# Patient Record
Sex: MALE | Race: BLACK OR AFRICAN AMERICAN | Employment: FULL TIME | URBAN - METROPOLITAN AREA
[De-identification: names, ages, dates, MRNs, and addresses within clinical notes are randomized per-mention and may not be internally consistent; named-entity substitution may affect disease eponyms.]

---

## 2019-09-09 ENCOUNTER — APPOINTMENT (OUTPATIENT)
Dept: GENERAL RADIOLOGY | Age: 39
End: 2019-09-09
Payer: COMMERCIAL

## 2019-09-09 ENCOUNTER — HOSPITAL ENCOUNTER (EMERGENCY)
Age: 39
Discharge: HOME | End: 2019-09-09
Attending: EMERGENCY MEDICINE
Payer: COMMERCIAL

## 2019-09-09 VITALS
RESPIRATION RATE: 12 BRPM | OXYGEN SATURATION: 98 % | TEMPERATURE: 97.7 F | SYSTOLIC BLOOD PRESSURE: 176 MMHG | DIASTOLIC BLOOD PRESSURE: 100 MMHG | BODY MASS INDEX: 35.4 KG/M2 | HEART RATE: 71 BPM | WEIGHT: 226 LBS

## 2019-09-09 DIAGNOSIS — I10 ESSENTIAL HYPERTENSION: ICD-10-CM

## 2019-09-09 DIAGNOSIS — S46.912A STRAIN OF LEFT SHOULDER, INITIAL ENCOUNTER: Primary | ICD-10-CM

## 2019-09-09 PROCEDURE — G0382 LEV 3 HOSP TYPE B ED VISIT: HCPCS

## 2019-09-09 PROCEDURE — 73030 X-RAY EXAM OF SHOULDER: CPT

## 2019-09-09 RX ORDER — NAPROXEN 500 MG/1
500 TABLET ORAL 2 TIMES DAILY
Qty: 14 TABLET | Refills: 0 | Status: ON HOLD | OUTPATIENT
Start: 2019-09-09 | End: 2022-08-05

## 2019-09-09 ASSESSMENT — PAIN DESCRIPTION - PROGRESSION
CLINICAL_PROGRESSION: NOT CHANGED
CLINICAL_PROGRESSION: NOT CHANGED

## 2019-09-09 ASSESSMENT — PAIN DESCRIPTION - ORIENTATION: ORIENTATION: LEFT

## 2019-09-09 ASSESSMENT — PAIN DESCRIPTION - DIRECTION: RADIATING_TOWARDS: NECK, LEFT ARM

## 2019-09-09 ASSESSMENT — PAIN DESCRIPTION - PAIN TYPE: TYPE: ACUTE PAIN

## 2019-09-09 ASSESSMENT — PAIN DESCRIPTION - FREQUENCY: FREQUENCY: CONTINUOUS

## 2019-09-09 ASSESSMENT — PAIN DESCRIPTION - DESCRIPTORS: DESCRIPTORS: SORE

## 2019-09-09 ASSESSMENT — PAIN SCALES - GENERAL: PAINLEVEL_OUTOF10: 6

## 2019-09-09 ASSESSMENT — PAIN - FUNCTIONAL ASSESSMENT: PAIN_FUNCTIONAL_ASSESSMENT: PREVENTS OR INTERFERES SOME ACTIVE ACTIVITIES AND ADLS

## 2019-09-09 ASSESSMENT — PAIN DESCRIPTION - LOCATION: LOCATION: SHOULDER
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
## 2022-08-05 ENCOUNTER — HOSPITAL ENCOUNTER (OUTPATIENT)
Age: 42
Setting detail: OBSERVATION
Discharge: HOME | End: 2022-08-07
Attending: EMERGENCY MEDICINE | Admitting: INTERNAL MEDICINE
Payer: COMMERCIAL

## 2022-08-05 ENCOUNTER — APPOINTMENT (OUTPATIENT)
Dept: GENERAL RADIOLOGY | Age: 42
End: 2022-08-05
Payer: COMMERCIAL

## 2022-08-05 ENCOUNTER — APPOINTMENT (OUTPATIENT)
Dept: CT IMAGING | Age: 42
End: 2022-08-05
Payer: COMMERCIAL

## 2022-08-05 DIAGNOSIS — I10 UNCONTROLLED HYPERTENSION: ICD-10-CM

## 2022-08-05 DIAGNOSIS — I16.0 HYPERTENSIVE URGENCY: Primary | ICD-10-CM

## 2022-08-05 DIAGNOSIS — I10 PRIMARY HYPERTENSION: ICD-10-CM

## 2022-08-05 DIAGNOSIS — R94.31 ABNORMAL EKG: ICD-10-CM

## 2022-08-05 DIAGNOSIS — R51.9 ACUTE INTRACTABLE HEADACHE, UNSPECIFIED HEADACHE TYPE: ICD-10-CM

## 2022-08-05 PROBLEM — Z72.0 TOBACCO ABUSE: Status: ACTIVE | Noted: 2022-08-05

## 2022-08-05 PROBLEM — E66.9 OBESITY WITH BODY MASS INDEX OF 30.0-39.9: Status: ACTIVE | Noted: 2022-08-05

## 2022-08-05 LAB
ALBUMIN SERPL-MCNC: 4.4 G/DL (ref 3.5–5.2)
ALP BLD-CCNC: 104 U/L (ref 40–129)
ALT SERPL-CCNC: 33 U/L (ref 0–40)
ANION GAP SERPL CALCULATED.3IONS-SCNC: 10 MMOL/L (ref 7–16)
AST SERPL-CCNC: 23 U/L (ref 0–39)
BASOPHILS ABSOLUTE: 0.06 E9/L (ref 0–0.2)
BASOPHILS RELATIVE PERCENT: 0.6 % (ref 0–2)
BILIRUB SERPL-MCNC: 0.4 MG/DL (ref 0–1.2)
BUN BLDV-MCNC: 11 MG/DL (ref 6–20)
CALCIUM SERPL-MCNC: 9.4 MG/DL (ref 8.6–10.2)
CHLORIDE BLD-SCNC: 103 MMOL/L (ref 98–107)
CO2: 24 MMOL/L (ref 22–29)
CREAT SERPL-MCNC: 1.2 MG/DL (ref 0.7–1.2)
D DIMER: <200 NG/ML DDU
EOSINOPHILS ABSOLUTE: 0.35 E9/L (ref 0.05–0.5)
EOSINOPHILS RELATIVE PERCENT: 3.4 % (ref 0–6)
GFR AFRICAN AMERICAN: >60
GFR NON-AFRICAN AMERICAN: >60 ML/MIN/1.73
GLUCOSE BLD-MCNC: 102 MG/DL (ref 74–99)
HCT VFR BLD CALC: 42.5 % (ref 37–54)
HEMOGLOBIN: 14.7 G/DL (ref 12.5–16.5)
IMMATURE GRANULOCYTES #: 0.04 E9/L
IMMATURE GRANULOCYTES %: 0.4 % (ref 0–5)
LYMPHOCYTES ABSOLUTE: 4.51 E9/L (ref 1.5–4)
LYMPHOCYTES RELATIVE PERCENT: 44.1 % (ref 20–42)
MAGNESIUM: 2.1 MG/DL (ref 1.6–2.6)
MCH RBC QN AUTO: 30.4 PG (ref 26–35)
MCHC RBC AUTO-ENTMCNC: 34.6 % (ref 32–34.5)
MCV RBC AUTO: 88 FL (ref 80–99.9)
MONOCYTES ABSOLUTE: 1.02 E9/L (ref 0.1–0.95)
MONOCYTES RELATIVE PERCENT: 10 % (ref 2–12)
NEUTROPHILS ABSOLUTE: 4.24 E9/L (ref 1.8–7.3)
NEUTROPHILS RELATIVE PERCENT: 41.5 % (ref 43–80)
PDW BLD-RTO: 13.6 FL (ref 11.5–15)
PLATELET # BLD: 264 E9/L (ref 130–450)
PMV BLD AUTO: 10.1 FL (ref 7–12)
POTASSIUM SERPL-SCNC: 4 MMOL/L (ref 3.5–5)
PRO-BNP: 74 PG/ML (ref 0–125)
RBC # BLD: 4.83 E12/L (ref 3.8–5.8)
SODIUM BLD-SCNC: 137 MMOL/L (ref 132–146)
TOTAL PROTEIN: 7.7 G/DL (ref 6.4–8.3)
TROPONIN, HIGH SENSITIVITY: <6 NG/L (ref 0–11)
TROPONIN, HIGH SENSITIVITY: <6 NG/L (ref 0–11)
WBC # BLD: 10.2 E9/L (ref 4.5–11.5)

## 2022-08-05 PROCEDURE — 6360000002 HC RX W HCPCS: Performed by: NURSE PRACTITIONER

## 2022-08-05 PROCEDURE — G0378 HOSPITAL OBSERVATION PER HR: HCPCS

## 2022-08-05 PROCEDURE — 6370000000 HC RX 637 (ALT 250 FOR IP): Performed by: NURSE PRACTITIONER

## 2022-08-05 PROCEDURE — 2580000003 HC RX 258

## 2022-08-05 PROCEDURE — 83880 ASSAY OF NATRIURETIC PEPTIDE: CPT

## 2022-08-05 PROCEDURE — 85025 COMPLETE CBC W/AUTO DIFF WBC: CPT

## 2022-08-05 PROCEDURE — 6370000000 HC RX 637 (ALT 250 FOR IP)

## 2022-08-05 PROCEDURE — 99285 EMERGENCY DEPT VISIT HI MDM: CPT

## 2022-08-05 PROCEDURE — 70450 CT HEAD/BRAIN W/O DYE: CPT

## 2022-08-05 PROCEDURE — APPSS45 APP SPLIT SHARED TIME 31-45 MINUTES: Performed by: NURSE PRACTITIONER

## 2022-08-05 PROCEDURE — 36415 COLL VENOUS BLD VENIPUNCTURE: CPT

## 2022-08-05 PROCEDURE — 2580000003 HC RX 258: Performed by: NURSE PRACTITIONER

## 2022-08-05 PROCEDURE — 93005 ELECTROCARDIOGRAM TRACING: CPT | Performed by: PHYSICIAN ASSISTANT

## 2022-08-05 PROCEDURE — 93005 ELECTROCARDIOGRAM TRACING: CPT | Performed by: EMERGENCY MEDICINE

## 2022-08-05 PROCEDURE — 96375 TX/PRO/DX INJ NEW DRUG ADDON: CPT

## 2022-08-05 PROCEDURE — 84484 ASSAY OF TROPONIN QUANT: CPT

## 2022-08-05 PROCEDURE — 71045 X-RAY EXAM CHEST 1 VIEW: CPT

## 2022-08-05 PROCEDURE — 6360000002 HC RX W HCPCS

## 2022-08-05 PROCEDURE — 96372 THER/PROPH/DIAG INJ SC/IM: CPT

## 2022-08-05 PROCEDURE — 99220 PR INITIAL OBSERVATION CARE/DAY 70 MINUTES: CPT | Performed by: INTERNAL MEDICINE

## 2022-08-05 PROCEDURE — 96376 TX/PRO/DX INJ SAME DRUG ADON: CPT

## 2022-08-05 PROCEDURE — 83735 ASSAY OF MAGNESIUM: CPT

## 2022-08-05 PROCEDURE — 85378 FIBRIN DEGRADE SEMIQUANT: CPT

## 2022-08-05 PROCEDURE — 96361 HYDRATE IV INFUSION ADD-ON: CPT

## 2022-08-05 PROCEDURE — 80053 COMPREHEN METABOLIC PANEL: CPT

## 2022-08-05 PROCEDURE — 96374 THER/PROPH/DIAG INJ IV PUSH: CPT

## 2022-08-05 RX ORDER — DIPHENHYDRAMINE HYDROCHLORIDE 50 MG/ML
25 INJECTION INTRAMUSCULAR; INTRAVENOUS ONCE
Status: COMPLETED | OUTPATIENT
Start: 2022-08-05 | End: 2022-08-05

## 2022-08-05 RX ORDER — ENOXAPARIN SODIUM 100 MG/ML
40 INJECTION SUBCUTANEOUS DAILY
Status: DISCONTINUED | OUTPATIENT
Start: 2022-08-05 | End: 2022-08-07 | Stop reason: HOSPADM

## 2022-08-05 RX ORDER — SODIUM CHLORIDE 9 MG/ML
INJECTION, SOLUTION INTRAVENOUS CONTINUOUS
Status: DISCONTINUED | OUTPATIENT
Start: 2022-08-05 | End: 2022-08-06

## 2022-08-05 RX ORDER — SODIUM CHLORIDE 0.9 % (FLUSH) 0.9 %
SYRINGE (ML) INJECTION
Status: COMPLETED
Start: 2022-08-05 | End: 2022-08-05

## 2022-08-05 RX ORDER — SODIUM CHLORIDE 0.9 % (FLUSH) 0.9 %
5-40 SYRINGE (ML) INJECTION PRN
Status: DISCONTINUED | OUTPATIENT
Start: 2022-08-05 | End: 2022-08-07 | Stop reason: HOSPADM

## 2022-08-05 RX ORDER — POLYETHYLENE GLYCOL 3350 17 G/17G
17 POWDER, FOR SOLUTION ORAL DAILY PRN
Status: DISCONTINUED | OUTPATIENT
Start: 2022-08-05 | End: 2022-08-07 | Stop reason: HOSPADM

## 2022-08-05 RX ORDER — LOSARTAN POTASSIUM 25 MG/1
25 TABLET ORAL DAILY
Status: DISCONTINUED | OUTPATIENT
Start: 2022-08-05 | End: 2022-08-06

## 2022-08-05 RX ORDER — METOCLOPRAMIDE HYDROCHLORIDE 5 MG/ML
10 INJECTION INTRAMUSCULAR; INTRAVENOUS ONCE
Status: COMPLETED | OUTPATIENT
Start: 2022-08-05 | End: 2022-08-05

## 2022-08-05 RX ORDER — ONDANSETRON 2 MG/ML
4 INJECTION INTRAMUSCULAR; INTRAVENOUS EVERY 6 HOURS PRN
Status: DISCONTINUED | OUTPATIENT
Start: 2022-08-05 | End: 2022-08-07 | Stop reason: HOSPADM

## 2022-08-05 RX ORDER — HYDRALAZINE HYDROCHLORIDE 20 MG/ML
10 INJECTION INTRAMUSCULAR; INTRAVENOUS ONCE
Status: COMPLETED | OUTPATIENT
Start: 2022-08-05 | End: 2022-08-05

## 2022-08-05 RX ORDER — ACETAMINOPHEN 325 MG/1
650 TABLET ORAL EVERY 6 HOURS PRN
Status: DISCONTINUED | OUTPATIENT
Start: 2022-08-05 | End: 2022-08-07 | Stop reason: HOSPADM

## 2022-08-05 RX ORDER — ACETAMINOPHEN 650 MG/1
650 SUPPOSITORY RECTAL EVERY 6 HOURS PRN
Status: DISCONTINUED | OUTPATIENT
Start: 2022-08-05 | End: 2022-08-07 | Stop reason: HOSPADM

## 2022-08-05 RX ORDER — ONDANSETRON 4 MG/1
4 TABLET, ORALLY DISINTEGRATING ORAL EVERY 8 HOURS PRN
Status: DISCONTINUED | OUTPATIENT
Start: 2022-08-05 | End: 2022-08-07 | Stop reason: HOSPADM

## 2022-08-05 RX ORDER — ACETAMINOPHEN 325 MG/1
650 TABLET ORAL ONCE
Status: COMPLETED | OUTPATIENT
Start: 2022-08-05 | End: 2022-08-05

## 2022-08-05 RX ORDER — NITROGLYCERIN 0.4 MG/1
0.4 TABLET SUBLINGUAL EVERY 5 MIN PRN
Status: DISCONTINUED | OUTPATIENT
Start: 2022-08-05 | End: 2022-08-07 | Stop reason: HOSPADM

## 2022-08-05 RX ORDER — ASPIRIN 81 MG/1
81 TABLET, CHEWABLE ORAL DAILY
Status: DISCONTINUED | OUTPATIENT
Start: 2022-08-06 | End: 2022-08-07 | Stop reason: HOSPADM

## 2022-08-05 RX ORDER — HYDRALAZINE HYDROCHLORIDE 20 MG/ML
10 INJECTION INTRAMUSCULAR; INTRAVENOUS EVERY 6 HOURS PRN
Status: DISCONTINUED | OUTPATIENT
Start: 2022-08-05 | End: 2022-08-07 | Stop reason: HOSPADM

## 2022-08-05 RX ORDER — SODIUM CHLORIDE 9 MG/ML
INJECTION, SOLUTION INTRAVENOUS PRN
Status: DISCONTINUED | OUTPATIENT
Start: 2022-08-05 | End: 2022-08-07 | Stop reason: HOSPADM

## 2022-08-05 RX ORDER — AMLODIPINE BESYLATE 5 MG/1
5 TABLET ORAL DAILY
Status: DISCONTINUED | OUTPATIENT
Start: 2022-08-05 | End: 2022-08-07 | Stop reason: HOSPADM

## 2022-08-05 RX ORDER — SODIUM CHLORIDE 0.9 % (FLUSH) 0.9 %
5-40 SYRINGE (ML) INJECTION EVERY 12 HOURS SCHEDULED
Status: DISCONTINUED | OUTPATIENT
Start: 2022-08-05 | End: 2022-08-07 | Stop reason: HOSPADM

## 2022-08-05 RX ADMIN — ACETAMINOPHEN 650 MG: 325 TABLET ORAL at 22:18

## 2022-08-05 RX ADMIN — HYDRALAZINE HYDROCHLORIDE 10 MG: 20 INJECTION, SOLUTION INTRAMUSCULAR; INTRAVENOUS at 14:40

## 2022-08-05 RX ADMIN — SODIUM CHLORIDE: 9 INJECTION, SOLUTION INTRAVENOUS at 22:22

## 2022-08-05 RX ADMIN — ACETAMINOPHEN 650 MG: 325 TABLET ORAL at 16:12

## 2022-08-05 RX ADMIN — DIPHENHYDRAMINE HYDROCHLORIDE 25 MG: 50 INJECTION, SOLUTION INTRAMUSCULAR; INTRAVENOUS at 16:51

## 2022-08-05 RX ADMIN — LOSARTAN POTASSIUM 25 MG: 25 TABLET, FILM COATED ORAL at 20:47

## 2022-08-05 RX ADMIN — AMLODIPINE BESYLATE 5 MG: 5 TABLET ORAL at 22:18

## 2022-08-05 RX ADMIN — HYDRALAZINE HYDROCHLORIDE 10 MG: 20 INJECTION, SOLUTION INTRAMUSCULAR; INTRAVENOUS at 16:09

## 2022-08-05 RX ADMIN — SODIUM CHLORIDE, PRESERVATIVE FREE 10 ML: 5 INJECTION INTRAVENOUS at 16:10

## 2022-08-05 RX ADMIN — ENOXAPARIN SODIUM 40 MG: 100 INJECTION SUBCUTANEOUS at 22:17

## 2022-08-05 RX ADMIN — METOCLOPRAMIDE 10 MG: 5 INJECTION, SOLUTION INTRAMUSCULAR; INTRAVENOUS at 16:49

## 2022-08-05 ASSESSMENT — PAIN DESCRIPTION - DESCRIPTORS: DESCRIPTORS: ACHING

## 2022-08-05 ASSESSMENT — PAIN DESCRIPTION - ORIENTATION: ORIENTATION: ANTERIOR

## 2022-08-05 ASSESSMENT — PAIN DESCRIPTION - FREQUENCY: FREQUENCY: INTERMITTENT

## 2022-08-05 ASSESSMENT — ENCOUNTER SYMPTOMS
COLOR CHANGE: 0
ABDOMINAL DISTENTION: 0
ABDOMINAL PAIN: 0
BACK PAIN: 0
SHORTNESS OF BREATH: 0
WHEEZING: 0
EYE REDNESS: 0
CHEST TIGHTNESS: 0

## 2022-08-05 ASSESSMENT — PAIN SCALES - GENERAL
PAINLEVEL_OUTOF10: 4
PAINLEVEL_OUTOF10: 4
PAINLEVEL_OUTOF10: 5

## 2022-08-05 ASSESSMENT — PAIN DESCRIPTION - PAIN TYPE: TYPE: ACUTE PAIN

## 2022-08-05 ASSESSMENT — PAIN - FUNCTIONAL ASSESSMENT
PAIN_FUNCTIONAL_ASSESSMENT: NONE - DENIES PAIN
PAIN_FUNCTIONAL_ASSESSMENT: 0-10

## 2022-08-05 ASSESSMENT — PAIN DESCRIPTION - LOCATION
LOCATION: HEAD
LOCATION: HEAD

## 2022-08-05 NOTE — ED PROVIDER NOTES
Ayad Andino 46 y.o. male PHMx of HTN and overweight presents to the ED c/o very high blood pressure. Patient reports that his wife bought a home blood pressure testing kit, test his blood pressure today and systolic was over 831. Patient reports that he has stress going on at home due to certain family issues. He reports he for started feeling funny about a week ago when he noticed his vision changed with some blurriness. He is a  and gets regular check ups with his PCP and is told he only has high blood pressure. Onset: Week ago. Location/Radiation: Headache, does not radiate, no chest pain. Duration: Constant. Characterization: minimal Blurry vision, minimal headache. Aggravating Factors: Stress. Relieving Factors: Denies. Severity: 4/10. Patient reports that he is suppose to take losartan, and that is the only medication he takes along with potassium supplement. He denies any other acute concerns or questions. Assx Sxs: High blood pressure, headache, blurry vision. He Denies: Chest pain, chest pressure, shortness of breath, numbness/tingling, dysuria/hematuria, decrease in urination. Review of Systems   Constitutional:  Negative for activity change and appetite change. Eyes:  Positive for visual disturbance. Negative for redness. Respiratory:  Negative for chest tightness, shortness of breath and wheezing. Cardiovascular:  Negative for chest pain, palpitations and leg swelling. Gastrointestinal:  Negative for abdominal distention and abdominal pain. Endocrine: Negative for cold intolerance and heat intolerance. Genitourinary:  Negative for difficulty urinating and flank pain. Musculoskeletal:  Negative for arthralgias and back pain. Skin:  Negative for color change and pallor. Allergic/Immunologic: Negative for environmental allergies and food allergies. Neurological:  Negative for dizziness and facial asymmetry.    Hematological:  Negative for sinus   Rate: normal  Axis: normal  Ectopy: none  Conduction: normal  ST Segments: no acute change and normal  T Waves: inversion in  v4, v5, v6, I, and aVl  Q Waves: none    Clinical Impression: T-wave inversions    Bry Frey DO  [JR]   1507 Re-evaluated Pt after receiving Hydralazine. Resting well, watching TV. [JR]   1921 Upon reviewing patient's EKG, suspicious ST changes in various leads. Patient still hypertensive at 192/100. [RW]   1922 Discussed potential admission at this time. Patient informed [RW]   1922 Repeat EKG ordered to determine if changes occurring. [RW]   200 Spoke with Dr. Jefry Huynh who is willing to admit pt to the medical floor at this time for prn control of pts BP and close follow up for possible lateral lead ischemic changes [RW]   1943 EKG Interpretation    Interpreted by emergency department physician    Rhythm: normal sinus   Rate: normal  Axis: left  Ectopy: none  Conduction: normal  Twave/ST Segments: nonspecific T/ST changes in various leads (unable to compare due to lack of prior EKG)  Q Waves: none    Clinical Impression: non-specific EKG    Breana Herrera DO   [RW]      ED Course User Index  [JR] Bry Frey DO  [RW] Breana Herrera DO         ED Course as of 08/06/22 1446   Fri Aug 05, 2022   1428 EKG Interpretation    Interpreted by emergency department physician    Rhythm: normal sinus   Rate: normal  Axis: normal  Ectopy: none  Conduction: normal  ST Segments: no acute change and normal  T Waves: inversion in  v4, v5, v6, I, and aVl  Q Waves: none    Clinical Impression: T-wave inversions    Bry Frey DO  [JR]   1507 Re-evaluated Pt after receiving Hydralazine. Resting well, watching TV. [JR]   1921 Upon reviewing patient's EKG, suspicious ST changes in various leads. Patient still hypertensive at 192/100. [RW]   1922 Discussed potential admission at this time. Patient informed [RW]   1922 Repeat EKG ordered to determine if changes occurring.  [RW]   1942 Spoke with Dr. Leanna Torres who is willing to admit pt to the medical floor at this time for prn control of pts BP and close follow up for possible lateral lead ischemic changes [RW]   1943 EKG Interpretation    Interpreted by emergency department physician    Rhythm: normal sinus   Rate: normal  Axis: left  Ectopy: none  Conduction: normal  Twave/ST Segments: nonspecific T/ST changes in various leads (unable to compare due to lack of prior EKG)  Q Waves: none    Clinical Impression: non-specific EKG    Nadine Chan DO   [RW]      ED Course User Index  [JR] Claude Edmond DO  [RW] Nadine Chan DO       --------------------------------------------- PAST HISTORY ---------------------------------------------  Past Medical History:  has a past medical history of Hypertension. Past Surgical History:  has no past surgical history on file. Social History:  reports that he has been smoking cigarettes. He has been smoking an average of 1 pack per day. He has never used smokeless tobacco. He reports current alcohol use. He reports that he does not use drugs. Family History: family history includes Birth Defects in his brother; Cancer in his paternal grandmother; Diabetes in his father, maternal aunt, maternal grandfather, maternal grandmother, maternal uncle, and paternal grandmother; Heart Disease in his father and paternal grandmother; High Blood Pressure in his father, maternal aunt, maternal grandfather, maternal grandmother, maternal uncle, mother, and paternal grandmother; Stroke in his maternal aunt, maternal grandfather, and maternal grandmother. The patients home medications have been reviewed. Allergies: Patient has no known allergies.     -------------------------------------------------- RESULTS -------------------------------------------------    LABS:  Results for orders placed or performed during the hospital encounter of 08/05/22   CBC with Auto Differential   Result Value Ref Range    WBC 10.2 4.5 - 11.5 E9/L    RBC 4.83 3.80 - 5.80 E12/L    Hemoglobin 14.7 12.5 - 16.5 g/dL    Hematocrit 42.5 37.0 - 54.0 %    MCV 88.0 80.0 - 99.9 fL    MCH 30.4 26.0 - 35.0 pg    MCHC 34.6 (H) 32.0 - 34.5 %    RDW 13.6 11.5 - 15.0 fL    Platelets 095 700 - 642 E9/L    MPV 10.1 7.0 - 12.0 fL    Neutrophils % 41.5 (L) 43.0 - 80.0 %    Immature Granulocytes % 0.4 0.0 - 5.0 %    Lymphocytes % 44.1 (H) 20.0 - 42.0 %    Monocytes % 10.0 2.0 - 12.0 %    Eosinophils % 3.4 0.0 - 6.0 %    Basophils % 0.6 0.0 - 2.0 %    Neutrophils Absolute 4.24 1.80 - 7.30 E9/L    Immature Granulocytes # 0.04 E9/L    Lymphocytes Absolute 4.51 (H) 1.50 - 4.00 E9/L    Monocytes Absolute 1.02 (H) 0.10 - 0.95 E9/L    Eosinophils Absolute 0.35 0.05 - 0.50 E9/L    Basophils Absolute 0.06 0.00 - 0.20 E9/L   Comprehensive Metabolic Panel   Result Value Ref Range    Sodium 137 132 - 146 mmol/L    Potassium 4.0 3.5 - 5.0 mmol/L    Chloride 103 98 - 107 mmol/L    CO2 24 22 - 29 mmol/L    Anion Gap 10 7 - 16 mmol/L    Glucose 102 (H) 74 - 99 mg/dL    BUN 11 6 - 20 mg/dL    Creatinine 1.2 0.7 - 1.2 mg/dL    GFR Non-African American >60 >=60 mL/min/1.73    GFR African American >60     Calcium 9.4 8.6 - 10.2 mg/dL    Total Protein 7.7 6.4 - 8.3 g/dL    Albumin 4.4 3.5 - 5.2 g/dL    Total Bilirubin 0.4 0.0 - 1.2 mg/dL    Alkaline Phosphatase 104 40 - 129 U/L    ALT 33 0 - 40 U/L    AST 23 0 - 39 U/L   Magnesium   Result Value Ref Range    Magnesium 2.1 1.6 - 2.6 mg/dL   Troponin   Result Value Ref Range    Troponin, High Sensitivity <6 0 - 11 ng/L   Brain Natriuretic Peptide   Result Value Ref Range    Pro-BNP 74 0 - 125 pg/mL   D-Dimer, Quantitative   Result Value Ref Range    D-Dimer, Quant <200 ng/mL DDU   Troponin   Result Value Ref Range    Troponin, High Sensitivity <6 0 - 11 ng/L   CBC   Result Value Ref Range    WBC 8.4 4.5 - 11.5 E9/L    RBC 4.99 3.80 - 5.80 E12/L    Hemoglobin 15.3 12.5 - 16.5 g/dL    Hematocrit 44.2 37.0 - 54.0 %    MCV 88.6 80.0 - 99.9 fL    MCH 30.7 26.0 - 35.0 pg    MCHC 34.6 (H) 32.0 - 34.5 %    RDW 13.7 11.5 - 15.0 fL    Platelets 557 886 - 131 E9/L    MPV 10.5 7.0 - 12.0 fL   Lipid panel - fasting   Result Value Ref Range    Cholesterol, Total 219 (H) 0 - 199 mg/dL    Triglycerides 117 0 - 149 mg/dL    HDL 38 >40 mg/dL    LDL Calculated 158 (H) 0 - 99 mg/dL    VLDL Cholesterol Calculated 23 mg/dL   Troponin   Result Value Ref Range    Troponin, High Sensitivity <6 0 - 11 ng/L   EKG 12 Lead   Result Value Ref Range    Ventricular Rate 70 BPM    Atrial Rate 70 BPM    P-R Interval 152 ms    QRS Duration 88 ms    Q-T Interval 408 ms    QTc Calculation (Bazett) 440 ms    P Axis 56 degrees    R Axis 8 degrees    T Axis -146 degrees   EKG 12 Lead   Result Value Ref Range    Ventricular Rate 67 BPM    Atrial Rate 67 BPM    P-R Interval 166 ms    QRS Duration 102 ms    Q-T Interval 432 ms    QTc Calculation (Bazett) 456 ms    P Axis 37 degrees    R Axis -20 degrees    T Axis 164 degrees   EKG 12 lead   Result Value Ref Range    Ventricular Rate 63 BPM    Atrial Rate 63 BPM    P-R Interval 162 ms    QRS Duration 96 ms    Q-T Interval 450 ms    QTc Calculation (Bazett) 460 ms    P Axis 38 degrees    R Axis -22 degrees    T Axis 161 degrees       RADIOLOGY:  XR CHEST PORTABLE   Final Result   No evidence of pneumonia or pleural effusion. CT HEAD WO CONTRAST   Final Result   1. No acute intracranial hemorrhage or edema. 2. Irregular areas of attenuation involving the scalp overlying left superior   aspect of the calvarium could indicate hematoma and inflammation at site of   recent trauma. Clinical correlation recommended. US RETROPERITONEAL COMPLETE    (Results Pending)           ------------------------- NURSING NOTES AND VITALS REVIEWED ---------------------------  Date / Time Roomed:  8/5/2022  2:00 PM  ED Bed Assignment:  [id number]    The nursing notes within the ED encounter and vital signs as below have been reviewed. Patient Vitals for the past 24 hrs:   BP Temp Temp src Pulse Resp SpO2 Height Weight   08/06/22 1331 (!) 182/88 98.1 °F (36.7 °C) Oral 81 18 98 % -- --   08/06/22 1127 (!) 174/95 97.9 °F (36.6 °C) Oral 81 18 97 % -- --   08/06/22 0719 (!) 175/89 97.6 °F (36.4 °C) Oral 72 18 97 % -- --   08/06/22 0637 -- -- -- -- -- -- -- 248 lb 14.4 oz (112.9 kg)   08/06/22 0630 135/77 -- -- 66 -- -- -- --   08/06/22 0445 (!) 180/101 97.4 °F (36.3 °C) Temporal 60 18 96 % -- --   08/06/22 0100 (!) 163/84 98.2 °F (36.8 °C) Temporal 74 19 95 % -- --   08/05/22 2144 (!) 169/87 -- -- -- -- -- -- --   08/05/22 2130 (!) 169/87 97.6 °F (36.4 °C) Temporal 80 18 98 % 5' 7\" (1.702 m) 245 lb (111.1 kg)   08/05/22 2047 (!) 194/91 97.8 °F (36.6 °C) Axillary 84 16 96 % -- --   08/05/22 2037 -- 97.8 °F (36.6 °C) -- -- -- -- -- --   08/05/22 2035 -- -- -- 76 16 98 % -- --   08/05/22 2031 (!) 206/98 -- -- -- -- -- -- --   08/05/22 2014 (!) 189/93 -- -- -- -- -- -- --   08/05/22 1932 (!) 179/96 -- -- 76 15 98 % -- --   08/05/22 1751 (!) 192/100 -- -- 66 15 -- -- --   08/05/22 1654 -- -- -- 66 -- 98 % -- --   08/05/22 1653 (!) 184/88 -- -- -- -- -- -- --   08/05/22 1613 (!) 229/109 -- -- 68 16 97 % -- --   08/05/22 1607 (!) 223/107 -- -- -- -- -- -- --   08/05/22 1514 (!) 218/112 -- -- -- -- -- -- --   08/05/22 1500 (!) 197/91 -- -- 71 -- -- -- --       Oxygen Saturation Interpretation: Normal    ------------------------------------------ PROGRESS NOTES ------------------------------------------  Re-evaluation(s):  Time: 1800  Patients symptoms are improving  Repeat physical examination is not changed    Counseling:  I have spoken with the patient and discussed todays results, in addition to providing specific details for the plan of care and counseling regarding the diagnosis and prognosis.   Their questions are answered at this time and they are agreeable with the plan of admission.    --------------------------------- ADDITIONAL PROVIDER NOTES ---------------------------------  Consultations:  Time: 1943. Spoke with [employer].  Discussed case. They will admit the patient. This patient's ED course included: a personal history and physicial examination, re-evaluation prior to disposition, multiple bedside re-evaluations, IV medications, cardiac monitoring, continuous pulse oximetry, and complex medical decision making and emergency management    This patient has remained hemodynamically stable during their ED course. Diagnosis:  1. Hypertensive urgency New Problem   2. Primary hypertension    3. Uncontrolled hypertension    4. Abnormal EKG    5. Acute intractable headache, unspecified headache type        Disposition:  Patient's disposition: Admit to telemetry  Patient's condition is stable.         Claudia Alba DO  Resident  08/06/22 [id number]

## 2022-08-05 NOTE — ED TRIAGE NOTES
FIRST PROVIDER CONTACT ASSESSMENT NOTE       Department of Emergency Medicine                 First Provider Note            22  1:48 PM EDT    Date of Encounter: No admission date for patient encounter. Patient Name: Arin Elliott  : 1980  MRN: [id number]    Chief Complaint: Hypertension (Pt having high bp for the past week. )      History of Present Illness:   Arin Elliott is a 46 y.o. male who presents to the ED for elevated blood pressure. Pt is on meds for same. Feeling dizzy, mild HA. Focused Physical Exam:  VS:    ED Triage Vitals   BP Temp Temp Source Heart Rate Resp SpO2 Height Weight   22 1338 22 1339 22 1339 22 1338 22 1338 22 1338 22 1339 22 1339   (!) 194/114 98.2 °F (36.8 °C) Oral 77 14 95 % 5' 7\" (1.702 m) 245 lb (111.1 kg)        Physical Ex: Constitutional: Alert and non-toxic. Medical History:  has a past medical history of Hypertension. Surgical History:  has no past surgical history on file. Social History:  reports that he has been smoking cigarettes. He has never used smokeless tobacco. He reports current alcohol use. He reports that he does not use drugs. Family History: family history is not on file. Allergies: Patient has no known allergies.      Initial Plan of Care: Initiate Treatment-Testing, Proceed toTreatment Area When Bed Available for ED Attending/MLP to Continue Care      ---END OF FIRST PROVIDER CONTACT ASSESSMENT NOTE---  Electronically signed by Celina Shoemaker PA-C   DD: 22

## 2022-08-05 NOTE — H&P
[facility] History and Physical      CHIEF COMPLAINT:  High blood pressure with headache    History of Present Illness: This is a 46year old male with significant PMH of hypertension. To ED due to high blood pressure with mild headache that has been going on for the last 3 weeks. Patient describes headache as constant dull pain behind right eye and rates pain 3-4/10. Associated symptoms include intermittent dizziness, intermittent shortness of breath and intermittent blurred vision. Patient's blood pressure at home was 280/112. Patient reports nothing making symptoms worse or better. Patient reports being on Losartan but admits to not taking medication in weeks. States that he has been In the process of moving from [place] to [place] and has not yet had a chance to establish new PCP in area. Denies recent illness, CP, abdominal pain, back pain, numbness/tingling, and/or changes in bowel/bladder habits. Lab work unremarkable. CXR negative. EKG shows NSR with incomplete right bundle branch block, T wave abnormality, and consider lateral ischemia. Troponin negative. Blood pressure high as 240/112 in ED. Given tylenol, benadryl, hydralazine x 2, and Reglan. Informant(s) for H&P: Patient and chart review    REVIEW OF SYSTEMS:  A comprehensive review of systems was negative except for: what is in the HPI      PMH:  Past Medical History:   Diagnosis Date    Hypertension        Surgical History:  No past surgical history on file. Medications Prior to Admission:    Prior to Admission medications    Medication Sig Start Date End Date Taking? Authorizing Provider   naproxen (NAPROSYN) 500 MG tablet Take 1 tablet by mouth 2 times daily for 7 days 9/9/19 9/16/19  Kaylynn Faustin MD       Allergies:    Patient has no known allergies. Social History:    reports that he has been smoking cigarettes. He has never used smokeless tobacco. He reports current alcohol use.  He reports that he does not use drugs. Family History:   family history is not on file. No reports of significant family history. PHYSICAL EXAM:  Vitals:  BP (!) 179/96   Pulse 76   Temp 98.4 °F (36.9 °C) (Oral)   Resp 15   Ht 5' 7\" (1.702 m)   Wt 245 lb (111.1 kg)   SpO2 98%   BMI 38.37 kg/m²     General Appearance: alert and oriented to person, place and time and in no acute distress  Skin: warm and dry  Head: normocephalic and atraumatic  Eyes: pupils equal, round, and reactive to light,  conjunctivae normal  Pulmonary/Chest: clear to auscultation bilaterally- no wheezes, rales or rhonchi, normal air movement, no respiratory distress  Cardiovascular: normal rate, normal S1 and S2  Abdomen: soft, non-tender, non-distended, normal bowel sounds, no masses or organomegaly  Extremities: no cyanosis, no clubbing and no edema  Neurologic: speech normal        LABS:  Recent Labs     08/05/22  1424      K 4.0      CO2 24   BUN 11   CREATININE 1.2   GLUCOSE 102*   CALCIUM 9.4       Recent Labs     08/05/22  1424   WBC 10.2   RBC 4.83   HGB 14.7   HCT 42.5   MCV 88.0   MCH 30.4   MCHC 34.6*   RDW 13.6      MPV 10.1       No results for input(s): POCGLU in the last 72 hours. Radiology:   XR CHEST PORTABLE   Final Result   No evidence of pneumonia or pleural effusion. CT HEAD WO CONTRAST   Final Result   1. No acute intracranial hemorrhage or edema. 2. Irregular areas of attenuation involving the scalp overlying left superior   aspect of the calvarium could indicate hematoma and inflammation at site of   recent trauma. Clinical correlation recommended. EKG:       ASSESSMENT:      Active Problems:    HTN (hypertension)    Tobacco abuse    Obesity with body mass index of 30.0-39.9  Resolved Problems:    * No resolved hospital problems. *      PLAN:    1. HTN- Losartan restarted, Hydralazine as needed for SBP >180, cardiology consult, exercise/nuclear stress test, and low salt diet.  NPO after midnight. 2.  Headache- Tylenol as needed for headache. 3.  Tobacco abuse- smoking cessation counseling initiated. 4.  Obesity with body mass index of 30.0-39.9- Life style modification discussed. Code Status: Full code  DVT prophylaxis: Lovenox     35 minutes or more spent reviewing patient chart, assessing patient, discussing plan of care with patient and family, discussing plan of care with collaborating physician, and documentation. NOTE: This report was transcribed using voice recognition software. Every effort was made to ensure accuracy; however, inadvertent computerized transcription errors may be present.   Electronically signed by ANTHONY Sevilla CNP on 8/5/2022 at 7:52 PM

## 2022-08-06 ENCOUNTER — APPOINTMENT (OUTPATIENT)
Dept: ULTRASOUND IMAGING | Age: 42
End: 2022-08-06
Payer: COMMERCIAL

## 2022-08-06 PROBLEM — E78.2 MIXED HYPERLIPIDEMIA: Status: ACTIVE | Noted: 2022-08-06

## 2022-08-06 PROBLEM — I51.7 LVH (LEFT VENTRICULAR HYPERTROPHY): Status: ACTIVE | Noted: 2022-08-06

## 2022-08-06 PROBLEM — R94.31 ABNORMAL EKG: Status: ACTIVE | Noted: 2022-08-06

## 2022-08-06 LAB
CHOLESTEROL, TOTAL: 219 MG/DL (ref 0–199)
EKG ATRIAL RATE: 67 BPM
EKG ATRIAL RATE: 70 BPM
EKG P AXIS: 37 DEGREES
EKG P AXIS: 56 DEGREES
EKG P-R INTERVAL: 152 MS
EKG P-R INTERVAL: 166 MS
EKG Q-T INTERVAL: 408 MS
EKG Q-T INTERVAL: 432 MS
EKG QRS DURATION: 102 MS
EKG QRS DURATION: 88 MS
EKG QTC CALCULATION (BAZETT): 440 MS
EKG QTC CALCULATION (BAZETT): 456 MS
EKG R AXIS: -20 DEGREES
EKG R AXIS: 8 DEGREES
EKG T AXIS: -146 DEGREES
EKG T AXIS: 164 DEGREES
EKG VENTRICULAR RATE: 67 BPM
EKG VENTRICULAR RATE: 70 BPM
HCT VFR BLD CALC: 44.2 % (ref 37–54)
HDLC SERPL-MCNC: 38 MG/DL
HEMOGLOBIN: 15.3 G/DL (ref 12.5–16.5)
LDL CHOLESTEROL CALCULATED: 158 MG/DL (ref 0–99)
LV EF: 70 %
LVEF MODALITY: NORMAL
MCH RBC QN AUTO: 30.7 PG (ref 26–35)
MCHC RBC AUTO-ENTMCNC: 34.6 % (ref 32–34.5)
MCV RBC AUTO: 88.6 FL (ref 80–99.9)
PDW BLD-RTO: 13.7 FL (ref 11.5–15)
PLATELET # BLD: 271 E9/L (ref 130–450)
PMV BLD AUTO: 10.5 FL (ref 7–12)
RBC # BLD: 4.99 E12/L (ref 3.8–5.8)
TRIGL SERPL-MCNC: 117 MG/DL (ref 0–149)
TROPONIN, HIGH SENSITIVITY: <6 NG/L (ref 0–11)
VLDLC SERPL CALC-MCNC: 23 MG/DL
WBC # BLD: 8.4 E9/L (ref 4.5–11.5)

## 2022-08-06 PROCEDURE — G0378 HOSPITAL OBSERVATION PER HR: HCPCS

## 2022-08-06 PROCEDURE — 93005 ELECTROCARDIOGRAM TRACING: CPT | Performed by: NURSE PRACTITIONER

## 2022-08-06 PROCEDURE — APPSS60 APP SPLIT SHARED TIME 46-60 MINUTES: Performed by: PHYSICIAN ASSISTANT

## 2022-08-06 PROCEDURE — 96361 HYDRATE IV INFUSION ADD-ON: CPT

## 2022-08-06 PROCEDURE — 2580000003 HC RX 258: Performed by: NURSE PRACTITIONER

## 2022-08-06 PROCEDURE — 99245 OFF/OP CONSLTJ NEW/EST HI 55: CPT | Performed by: INTERNAL MEDICINE

## 2022-08-06 PROCEDURE — 76770 US EXAM ABDO BACK WALL COMP: CPT

## 2022-08-06 PROCEDURE — 80061 LIPID PANEL: CPT

## 2022-08-06 PROCEDURE — 93306 TTE W/DOPPLER COMPLETE: CPT

## 2022-08-06 PROCEDURE — 6360000002 HC RX W HCPCS: Performed by: NURSE PRACTITIONER

## 2022-08-06 PROCEDURE — 85027 COMPLETE CBC AUTOMATED: CPT

## 2022-08-06 PROCEDURE — 96372 THER/PROPH/DIAG INJ SC/IM: CPT

## 2022-08-06 PROCEDURE — 6370000000 HC RX 637 (ALT 250 FOR IP): Performed by: INTERNAL MEDICINE

## 2022-08-06 PROCEDURE — 36415 COLL VENOUS BLD VENIPUNCTURE: CPT

## 2022-08-06 PROCEDURE — 96376 TX/PRO/DX INJ SAME DRUG ADON: CPT

## 2022-08-06 PROCEDURE — 84484 ASSAY OF TROPONIN QUANT: CPT

## 2022-08-06 PROCEDURE — 6370000000 HC RX 637 (ALT 250 FOR IP): Performed by: NURSE PRACTITIONER

## 2022-08-06 PROCEDURE — 99225 PR SBSQ OBSERVATION CARE/DAY 25 MINUTES: CPT | Performed by: INTERNAL MEDICINE

## 2022-08-06 RX ORDER — LOSARTAN POTASSIUM 50 MG/1
50 TABLET ORAL DAILY
Status: DISCONTINUED | OUTPATIENT
Start: 2022-08-07 | End: 2022-08-07 | Stop reason: HOSPADM

## 2022-08-06 RX ORDER — ATORVASTATIN CALCIUM 20 MG/1
20 TABLET, FILM COATED ORAL NIGHTLY
Status: DISCONTINUED | OUTPATIENT
Start: 2022-08-06 | End: 2022-08-07 | Stop reason: HOSPADM

## 2022-08-06 RX ORDER — HYDROCHLOROTHIAZIDE 25 MG/1
25 TABLET ORAL DAILY
Status: DISCONTINUED | OUTPATIENT
Start: 2022-08-06 | End: 2022-08-07 | Stop reason: HOSPADM

## 2022-08-06 RX ORDER — KETOROLAC TROMETHAMINE 30 MG/ML
30 INJECTION, SOLUTION INTRAMUSCULAR; INTRAVENOUS EVERY 6 HOURS PRN
Status: DISCONTINUED | OUTPATIENT
Start: 2022-08-06 | End: 2022-08-07 | Stop reason: HOSPADM

## 2022-08-06 RX ORDER — LOSARTAN POTASSIUM 25 MG/1
25 TABLET ORAL ONCE
Status: COMPLETED | OUTPATIENT
Start: 2022-08-06 | End: 2022-08-06

## 2022-08-06 RX ADMIN — HYDRALAZINE HYDROCHLORIDE 10 MG: 20 INJECTION INTRAMUSCULAR; INTRAVENOUS at 20:37

## 2022-08-06 RX ADMIN — ACETAMINOPHEN 650 MG: 325 TABLET ORAL at 12:01

## 2022-08-06 RX ADMIN — SODIUM CHLORIDE, PRESERVATIVE FREE 10 ML: 5 INJECTION INTRAVENOUS at 01:03

## 2022-08-06 RX ADMIN — LOSARTAN POTASSIUM 25 MG: 25 TABLET, FILM COATED ORAL at 13:35

## 2022-08-06 RX ADMIN — SODIUM CHLORIDE, PRESERVATIVE FREE 10 ML: 5 INJECTION INTRAVENOUS at 20:31

## 2022-08-06 RX ADMIN — ACETAMINOPHEN 650 MG: 325 TABLET ORAL at 05:12

## 2022-08-06 RX ADMIN — HYDROCHLOROTHIAZIDE 25 MG: 25 TABLET ORAL at 14:15

## 2022-08-06 RX ADMIN — ENOXAPARIN SODIUM 40 MG: 100 INJECTION SUBCUTANEOUS at 09:07

## 2022-08-06 RX ADMIN — ATORVASTATIN CALCIUM 20 MG: 20 TABLET, FILM COATED ORAL at 20:31

## 2022-08-06 RX ADMIN — HYDRALAZINE HYDROCHLORIDE 10 MG: 20 INJECTION INTRAMUSCULAR; INTRAVENOUS at 05:13

## 2022-08-06 RX ADMIN — LOSARTAN POTASSIUM 25 MG: 25 TABLET, FILM COATED ORAL at 09:06

## 2022-08-06 RX ADMIN — ASPIRIN 81 MG CHEWABLE TABLET 81 MG: 81 TABLET CHEWABLE at 09:06

## 2022-08-06 RX ADMIN — ACETAMINOPHEN 650 MG: 325 TABLET ORAL at 20:37

## 2022-08-06 RX ADMIN — AMLODIPINE BESYLATE 5 MG: 5 TABLET ORAL at 09:06

## 2022-08-06 ASSESSMENT — PAIN SCALES - GENERAL
PAINLEVEL_OUTOF10: 4
PAINLEVEL_OUTOF10: 2
PAINLEVEL_OUTOF10: 0
PAINLEVEL_OUTOF10: 3

## 2022-08-06 ASSESSMENT — PAIN - FUNCTIONAL ASSESSMENT: PAIN_FUNCTIONAL_ASSESSMENT: ACTIVITIES ARE NOT PREVENTED

## 2022-08-06 ASSESSMENT — PAIN DESCRIPTION - DESCRIPTORS: DESCRIPTORS: ACHING

## 2022-08-06 ASSESSMENT — PAIN DESCRIPTION - LOCATION: LOCATION: HEAD

## 2022-08-06 NOTE — CARE COORDINATION
Social Work / Discharge Planning : SW noted consult for NO PCP. SW met with patient and explained role as discharge planner/ transition of care. Patient independent from HOME and recently moved [facility]. Patient has insurance but no PCP. Pre-service explained and added to AVS and patient acknowledged he will follow up with establishing a PCP at discharge. Patient expressed no other needs. Family can transport at discharge. SW to follow.  Electronically signed by JILLIAN Bailon on 8/6/22 at 11:20 AM EDT

## 2022-08-06 NOTE — CONSULTS
Inpatient [facility] Cardiology Consultation      Reason for Consult: Uncontrolled HTN, EKG changes    Consulting Physician: Dr. Vanesa Blair    Requesting Physician: Dr. Kendall Waldrop    Date of Consultation: 8/6/2022    HISTORY OF PRESENT ILLNESS:   Patient is a 46year old AAM not previously known to [facility] Cardiology. He is being seen in consultation this hospital admission by Dr. Vanesa Blair for evaluation and recommendations regarding EKG changes and uncontrolled HTN    PMH: HTN, anxiety, morbid obesity, tobacco abuse and medical non-compliance. Denies history of HLD, DM, CHRISTOPHER, CAD, MI, CHF, VHD or cardiac arrhythmia. No prior cardiac testing    Patient presented to [facility] on August 5, 2022 with complaints of headache, SOB and high BP. Per patient, he recently moved from [place] to [place] and has unable to establish with a new PCP during this time. He ran out of his antihypertensive medication a few weeks ago, and has not been on any home medications since. He notes of HA, with occasional SOB on exertion. Yesterday, his significant other bought a home BP cuff --> checked at home, systolic in the 063Z --> presented to the ED as a result. Denies significant CP, nausea, emesis, palpitations, dizziness, near syncope or syncope. Denies PND, orthopnea or peripheral edema. Please note: past medical records were reviewed per electronic medical record (EMR) - see detailed reports under Past Medical/ Surgical History. PAST MEDICAL HISTORY:    HTN. Diagnosed at age 41  Anxiety  Morbid obesity  Tobacco abuse   Medical non-compliance  Admits to negative sleep study within the past couple of years     PAST SURGICAL HISTORY:    History reviewed. No pertinent surgical history.     HOME MEDICATIONS:  Prior to Admission medications    Not on File       CURRENT MEDICATIONS:      Current Facility-Administered Medications:     0.9 % sodium chloride infusion, , IntraVENous, Continuous, ANTHONY Boyd - CNP, Last Rate: 75 mL/hr at 08/05/22 2222, New Bag at 08/05/22 2222    sodium chloride flush 0.9 % injection 5-40 mL, 5-40 mL, IntraVENous, 2 times per day, Cosby Hue, APRN - CNP, 10 mL at 08/06/22 0103    sodium chloride flush 0.9 % injection 5-40 mL, 5-40 mL, IntraVENous, PRN, Cosby Hue, APRN - CNP    0.9 % sodium chloride infusion, , IntraVENous, PRN, Cosby Hue, APRN - CNP    ondansetron (ZOFRAN-ODT) disintegrating tablet 4 mg, 4 mg, Oral, Q8H PRN **OR** ondansetron (ZOFRAN) injection 4 mg, 4 mg, IntraVENous, Q6H PRN, Cosby Hue, APRN - CNP    acetaminophen (TYLENOL) tablet 650 mg, 650 mg, Oral, Q6H PRN, 650 mg at 08/06/22 0512 **OR** acetaminophen (TYLENOL) suppository 650 mg, 650 mg, Rectal, Q6H PRN, Stuart Hue, APRN - CNP    polyethylene glycol (GLYCOLAX) packet 17 g, 17 g, Oral, Daily PRN, Stuart Hue, APRN - CNP    aspirin chewable tablet 81 mg, 81 mg, Oral, Daily, Cosby Hue, APRN - CNP    losartan (COZAAR) tablet 25 mg, 25 mg, Oral, Daily, Stuart Hue, APRN - CNP, 25 mg at 08/05/22 2047    nitroGLYCERIN (NITROSTAT) SL tablet 0.4 mg, 0.4 mg, SubLINGual, Q5 Min PRN, Cosby Hue, APRN - CNP    enoxaparin (LOVENOX) injection 40 mg, 40 mg, SubCUTAneous, Daily, Cosby Hue, APRN - CNP, 40 mg at 08/05/22 2217    hydrALAZINE (APRESOLINE) injection 10 mg, 10 mg, IntraVENous, Q6H PRN, Stuart Hue, APRN - CNP, 10 mg at 08/06/22 0513    amLODIPine (NORVASC) tablet 5 mg, 5 mg, Oral, Daily, Stuart Hue, APRN - CNP, 5 mg at 08/05/22 2218      ALLERGIES:  Patient has no known allergies. SOCIAL HISTORY:    Current tobacco smoker, has smoked < 1ppd for 15+ years. Social ETOH use. Denies former/current illicit drug use      FAMILY HISTORY:   Multiple uncles and grandmother with history of CAD/CABG.   Denies other pertinent cardiac family medical history to me at this time      REVIEW OF SYSTEMS:     Negative except as noted above in HPI      PHYSICAL EXAM:   /77   Pulse 66 Temp 97.4 °F (36.3 °C) (Temporal)   Resp 18   Ht 5' 7\" (1.702 m)   Wt 248 lb 14.4 oz (112.9 kg)   SpO2 96%   BMI 38.98 kg/m²   CONST:  Well developed, obese AAM who appears stated age. Awake, alert, cooperative, no apparent distress. HEENT:   Head- Normocephalic, atraumatic. Eyes- Conjunctivae pink, anicteric. Neck-  No stridor, trachea midline, no apparent jugular venous distention. CHEST: Chest symmetrical and non-tender to palpation. No accessory muscle use or intercostal retractions. RESPIRATORY: Lung sounds - clear throughout fields. No wheezing, rales or rhonchi. CARDIOVASCULAR:     No noted carotid bruit. Heart Ausculation- Regular rate and rhythm, no apparent murmur. PV: No lower extremity edema. No varicosities. Pedal pulses palpable, no clubbing or cyanosis. ABDOMEN: Soft, non-tender to light palpation. Bowel sounds present. MS: Good muscle strength and tone. No atrophy or abnormal movements. SKIN: Warm and dry. NEURO / PSYCH: Oriented to person, place and time. Speech clear and appropriate. Follows all commands. Pleasant affect. DATA:    Telemetry: SR with HR in the 60s    Diagnostic:  All diagnostic testing and lab work thus far this admission reviewed in detail. CT Head 8/5/2022  Impression  1. No acute intracranial hemorrhage or edema. 2. Irregular areas of attenuation involving the scalp overlying left superior  aspect of the calvarium could indicate hematoma and inflammation at site of  recent trauma. Clinical correlation recommended. CXR 8/5/2022  Impression  No evidence of pneumonia or pleural effusion.       No intake or output data in the 24 hours ending 08/06/22 0707    Labs:   CBC:   Recent Labs     08/05/22  1424 08/06/22  0454   WBC 10.2 8.4   HGB 14.7 15.3   HCT 42.5 44.2    271     BMP:   Recent Labs     08/05/22  1424      K 4.0   CO2 24   BUN 11   CREATININE 1.2   LABGLOM >60   CALCIUM 9.4     Mag:   Recent Labs     08/05/22  1424   MG 2. 1     FASTING LIPID PANEL:  Lab Results   Component Value Date/Time    CHOL 219 08/06/2022 04:54 AM    HDL 38 08/06/2022 04:54 AM    LDLCALC 158 08/06/2022 04:54 AM    TRIG 117 08/06/2022 04:54 AM     LIVER PROFILE:  Recent Labs     08/05/22  1424   AST 23   ALT 33   LABALBU 4.4      Ref Range & Units 08/05/22 1424   Troponin, High Sensitivity 0 - 11 ng/L <6       Ref Range & Units 08/05/22 1749   Troponin, High Sensitivity 0 - 11 ng/L <6      Ref Range & Units 08/06/22 0454    Troponin, High Sensitivity 0 - 11 ng/L <6       Ref Range & Units 08/05/22 1424   Pro-BNP 0 - 125 pg/mL 74       Ref Range & Units 08/05/22 1645   D-Dimer, Quant ng/mL DDU <200          ASSESSMENT:  Uncontrolled HTN  HLD, not on statin therapy  Morbid obesity / BMI 39  Continued tobacco abuse  Anxiety  Medical non-compliance (see HPI)      RECOMMENDATIONS:  Agree with Losartan and Norvasc therapies, continue to titrate as needed for optimal BP control  TTE for evaluation of LV/RV function  Renal US to rule out renal artery stenosis, consider need for further workup of secondary causes of HTN. Prior negative CHRISTOPHER work-up per patient. Cancel stress test given uncontrolled HTN -- can consider as outpatient if symptoms persist despite BP control  Consider statin initiation  Rest as per primary service and other consultants    The above case and recommendations have been discussed and made in collaboration with Dr. Demetra Odell.  Further recommendations to follow as per him      NOTE: This report was transcribed using voice recognition software. Every effort was made to ensure accuracy; however, inadvertent computerized transcription errors may be present. Sean Maria, [address] [place] [address], [place] [address] Cardiology    Electronically signed by Daina Parker PA-C on 8/6/2022 at 7:07 AM      ___________________________________________________________________________  I independently interviewed and examined the patient.  I have reviewed the above documentation completed by the CLAIRE. Please see my additional contributions to the HPI, physical exam, and assessment / medical decision making. HPI, ROS, PMH, PSH, [address], [place], and medications independently reviewed (agree; see above documentation)    History of Present Illness:  Currently with no chest pain, respiratory distress, or palpitations. Noncompliant taking his anti-HTN medications as an outpatient. SBP > 230 on admission. SR on EKG and telemetry. Review of Systems:   Cardiac: As per HPI  General: No fever, chills  Pulmonary: As per HPI  HEENT: No visual disturbances, difficult swallowing  GI: No nausea, vomiting  : No dysuria, hematuria  Endocrine: No thyroid disease or DM  Musculoskeletal: TILLMAN x 4, no focal motor deficits  Skin: Intact, no rashes  Neuro: No headache, seizures  Psych: Currently with no depression, anxiety    Physical Exam:  BP (!) 182/88   Pulse 81   Temp 98.1 °F (36.7 °C) (Oral)   Resp 18   Ht 5' 7\" (1.702 m)   Wt 248 lb 14.4 oz (112.9 kg)   SpO2 98%   BMI 38.98 kg/m²   Wt Readings from Last 3 Encounters:   08/06/22 248 lb 14.4 oz (112.9 kg)   09/09/19 226 lb (102.5 kg)   02/28/18 240 lb (108.9 kg)     Appearance: Awake, alert, no acute respiratory distress  Skin: Intact, no rash  Head: Normocephalic, atraumatic  Eyes: EOMI, no conjunctival erythema  ENMT: No pharyngeal erythema, MMM, no rhinorrhea  Neck: Supple, no elevated JVP, no carotid bruits  Lungs: Clear to auscultation bilaterally. No wheezes, rales, or rhonchi.   Cardiac: Regular rate and rhythm, +S1S2, no murmurs apparent  Abdomen: Soft, nontender, +bowel sounds  Extremities: Moves all extremities x 4, no lower extremity edema  Neurologic: No focal motor deficits apparent, normal mood and affect      Laboratory Tests:  Recent Labs     08/05/22  1424      K 4.0      CO2 24   BUN 11   CREATININE 1.2   GLUCOSE 102*   CALCIUM 9.4     Lab Results   Component Value Date/Time    MG 2.1 08/05/2022 02:24 PM     Recent Labs 08/05/22  1424   ALKPHOS 104   ALT 33   AST 23   PROT 7.7   BILITOT 0.4   LABALBU 4.4     Recent Labs     08/05/22  1424 08/06/22  0454   WBC 10.2 8.4   RBC 4.83 4.99   HGB 14.7 15.3   HCT 42.5 44.2   MCV 88.0 88.6   MCH 30.4 30.7   MCHC 34.6* 34.6*   RDW 13.6 13.7    271   MPV 10.1 10.5     No results found for: CKTOTAL, CKMB, CKMBINDEX, TROPONINI  Recent Labs     08/05/22  1424 08/05/22  1749 08/06/22  0454   TROPHS <6 <6 <6     No results found for: TSHFT4, TSH  No results found for: LABA1C  No results found for: EAG  Lab Results   Component Value Date    CHOL 219 (H) 08/06/2022     Lab Results   Component Value Date    TRIG 117 08/06/2022     Lab Results   Component Value Date    HDL 38 08/06/2022     Lab Results   Component Value Date    LDLCALC 158 (H) 08/06/2022     Lab Results   Component Value Date    LABVLDL 23 08/06/2022     No results found for: CHOLHDLRATIO  Recent Labs     08/05/22  1424   PROBNP 74       EKG personally reviewed: SR, rate 63, LVH and secondary STT changes    Telemetry personally reviewed (date: 8/6/2022): SR, rate 70's    Echocardiogram reviewed: 8/6/22   Normal left ventricular systolic function. Ejection fraction is visually estimated at 70%. Moderate concentric left ventricular hypertrophy. Normal right ventricular size and function (TAPSE 3.1 cm). No evidence of hemodynamically significant valve disease.    - 's on admission / most recent 's  - Will cancel exercise nuclear stress test  - Will order echocardiogram  - Will order renal artery doppler  - Prior negative sleep study per patient (consider repeat sleep study if no recent study)  - Anti-HTN regimen restarted this admission (continue to up-titrate as needed)  - Aggressive risk factor modifications / counseled re: tobacco cessation  - Telemetry reviewed (SR, no new arrhythmias)      ADDENDUM:  Echocardiogram reviewed: 8/6/22   Normal left ventricular systolic function.    Ejection fraction is visually estimated at 70%. Moderate concentric left ventricular hypertrophy. Normal right ventricular size and function (TAPSE 3.1 cm). No evidence of hemodynamically significant valve disease. Thank you for allowing me to participate in your patient's care. Please feel free to contact me if you have any questions or concerns.     Elizabeth Haskins MD  [facility]) Cardiology

## 2022-08-06 NOTE — PROGRESS NOTES
[facility] Progress Note    Admitting Date and Time: 8/5/2022  2:00 PM  Admit Dx: Hypertensive urgency [I16.0]      Assessment:    Principal Problem:    Hypertensive urgency  Active Problems:    HTN (hypertension)    Tobacco abuse    Obesity with body mass index of 30.0-39.9  Resolved Problems:    * No resolved hospital problems. *      Plan:  1. Hypertension  - hypertensive urgency present on arrival to ED with /137 with associated headache and also EKG changes laterally. CT head negative acute pathology  Troponin 6->6->6    He was medicated with several doses IV hydralazine with improved pressures. Previously had been on Losartan/HCTZ 50-12.5. States recent family stress and moving from [place] to this area, has stopped taking medications    Resumed Losartan 25 mg, can titrate up to 50 mg and add on HCTZ  He was started on amlodipine 5 mg QD    Pressures remain elevated 233L systolic  US renal arteries pending. ECHO pending    Had some lateral TWI but denied chest discomfort and flat troponin trend does not suggest acute coronary syndrome. He can follow up outpatient for stress testing. Plan to monitor BP response overnight with medication adjustments        Subjective:  Patient is being followed for Hypertensive urgency [I16.0]     No overnight events.  BP improved to 158J systolic but is getting frequent dosing PRN Hydralazine    Headache improved but not resolved  Denies chest pain    ROS: denies fever, chills, cp, sob, n/v, unless stated above.      sodium chloride flush  5-40 mL IntraVENous 2 times per day    aspirin  81 mg Oral Daily    losartan  25 mg Oral Daily    enoxaparin  40 mg SubCUTAneous Daily    amLODIPine  5 mg Oral Daily     perflutren lipid microspheres, 1.5 mL, ONCE PRN  sodium chloride flush, 5-40 mL, PRN  sodium chloride, , PRN  ondansetron, 4 mg, Q8H PRN   Or  ondansetron, 4 mg, Q6H PRN  acetaminophen, 650 mg, Q6H PRN   Or  acetaminophen, 650 mg, Q6H PRN  polyethylene glycol, 17 g, Daily PRN  nitroGLYCERIN, 0.4 mg, Q5 Min PRN  hydrALAZINE, 10 mg, Q6H PRN         Objective:    BP (!) 174/95   Pulse 81   Temp 97.9 °F (36.6 °C) (Oral)   Resp 18   Ht 5' 7\" (1.702 m)   Wt 248 lb 14.4 oz (112.9 kg)   SpO2 97%   BMI 38.98 kg/m²     General Appearance: alert and oriented to person, place and time and in no acute distress  Skin: warm and dry  Head: normocephalic and atraumatic  Eyes: pupils equal, round, and reactive to light, extraocular eye movements intact, conjunctivae normal  Neck: neck supple and non tender without mass   Pulmonary/Chest: clear to auscultation bilaterally- no wheezes, rales or rhonchi, normal air movement, no respiratory distress  Cardiovascular: normal rate, normal S1 and S2 and no carotid bruits  Abdomen: soft, non-tender, non-distended, normal bowel sounds, no masses or organomegaly  Extremities: no cyanosis, no clubbing and no edema  Neurologic: no cranial nerve deficit and speech normal        Recent Labs     08/05/22  1424      K 4.0      CO2 24   BUN 11   CREATININE 1.2   GLUCOSE 102*   CALCIUM 9.4       Recent Labs     08/05/22  1424 08/06/22  0454   WBC 10.2 8.4   RBC 4.83 4.99   HGB 14.7 15.3   HCT 42.5 44.2   MCV 88.0 88.6   MCH 30.4 30.7   MCHC 34.6* 34.6*   RDW 13.6 13.7    271   MPV 10.1 10.5       Radiology:   1. No acute intracranial hemorrhage or edema. 2. Irregular areas of attenuation involving the scalp overlying left superior   aspect of the calvarium could indicate hematoma and inflammation at site of   recent trauma. Clinical correlation recommended. NOTE: This report was transcribed using voice recognition software. Every effort was made to ensure accuracy; however, inadvertent computerized transcription errors may be present.   Electronically signed by ANTHONY Mack CNP on 8/6/2022 at 1:21 PM

## 2022-08-06 NOTE — PLAN OF CARE
Problem: Pain  Goal: Verbalizes/displays adequate comfort level or baseline comfort level  Outcome: Progressing  Flowsheets  Taken 8/6/2022 0100  Verbalizes/displays adequate comfort level or baseline comfort level: Encourage patient to monitor pain and request assistance  Taken 8/5/2022 2130  Verbalizes/displays adequate comfort level or baseline comfort level: Encourage patient to monitor pain and request assistance     Problem: Skin/Tissue Integrity  Goal: Absence of new skin breakdown  Description: 1. Monitor for areas of redness and/or skin breakdown  2. Assess vascular access sites hourly  3. Every 4-6 hours minimum:  Change oxygen saturation probe site  4. Every 4-6 hours:  If on nasal continuous positive airway pressure, respiratory therapy assess nares and determine need for appliance change or resting period.   Outcome: Progressing     Problem: ABCDS Injury Assessment  Goal: Absence of physical injury  Outcome: Progressing

## 2022-08-06 NOTE — PROGRESS NOTES
Call received from patient's mother. Family history updated. Mother updated with current plan of care.

## 2022-08-07 VITALS
OXYGEN SATURATION: 97 % | HEIGHT: 67 IN | TEMPERATURE: 98.4 F | RESPIRATION RATE: 18 BRPM | BODY MASS INDEX: 38.3 KG/M2 | WEIGHT: 244 LBS | HEART RATE: 79 BPM | SYSTOLIC BLOOD PRESSURE: 151 MMHG | DIASTOLIC BLOOD PRESSURE: 69 MMHG

## 2022-08-07 PROCEDURE — 96372 THER/PROPH/DIAG INJ SC/IM: CPT

## 2022-08-07 PROCEDURE — 6370000000 HC RX 637 (ALT 250 FOR IP): Performed by: NURSE PRACTITIONER

## 2022-08-07 PROCEDURE — 6360000002 HC RX W HCPCS: Performed by: NURSE PRACTITIONER

## 2022-08-07 PROCEDURE — 96376 TX/PRO/DX INJ SAME DRUG ADON: CPT

## 2022-08-07 PROCEDURE — 99217 PR OBSERVATION CARE DISCHARGE MANAGEMENT: CPT | Performed by: NURSE PRACTITIONER

## 2022-08-07 PROCEDURE — 99225 PR SBSQ OBSERVATION CARE/DAY 25 MINUTES: CPT | Performed by: INTERNAL MEDICINE

## 2022-08-07 PROCEDURE — 2580000003 HC RX 258: Performed by: NURSE PRACTITIONER

## 2022-08-07 PROCEDURE — G0378 HOSPITAL OBSERVATION PER HR: HCPCS

## 2022-08-07 RX ORDER — LOSARTAN POTASSIUM AND HYDROCHLOROTHIAZIDE 12.5; 5 MG/1; MG/1
1 TABLET ORAL DAILY
Qty: 30 TABLET | Refills: 3 | Status: SHIPPED | OUTPATIENT
Start: 2022-08-07 | End: 2022-10-26 | Stop reason: ALTCHOICE

## 2022-08-07 RX ORDER — ASPIRIN 81 MG/1
81 TABLET, CHEWABLE ORAL DAILY
Qty: 30 TABLET | Refills: 3 | COMMUNITY
Start: 2022-08-08

## 2022-08-07 RX ORDER — AMLODIPINE BESYLATE 5 MG/1
5 TABLET ORAL DAILY
Qty: 30 TABLET | Refills: 3 | Status: SHIPPED | OUTPATIENT
Start: 2022-08-08 | End: 2022-10-26 | Stop reason: ALTCHOICE

## 2022-08-07 RX ORDER — ATORVASTATIN CALCIUM 20 MG/1
20 TABLET, FILM COATED ORAL NIGHTLY
Qty: 30 TABLET | Refills: 3 | Status: SHIPPED | OUTPATIENT
Start: 2022-08-07 | End: 2022-10-26 | Stop reason: ALTCHOICE

## 2022-08-07 RX ADMIN — SODIUM CHLORIDE, PRESERVATIVE FREE 10 ML: 5 INJECTION INTRAVENOUS at 09:42

## 2022-08-07 RX ADMIN — ASPIRIN 81 MG CHEWABLE TABLET 81 MG: 81 TABLET CHEWABLE at 09:39

## 2022-08-07 RX ADMIN — ACETAMINOPHEN 650 MG: 325 TABLET ORAL at 09:39

## 2022-08-07 RX ADMIN — ENOXAPARIN SODIUM 40 MG: 100 INJECTION SUBCUTANEOUS at 09:39

## 2022-08-07 RX ADMIN — AMLODIPINE BESYLATE 5 MG: 5 TABLET ORAL at 09:39

## 2022-08-07 RX ADMIN — HYDRALAZINE HYDROCHLORIDE 10 MG: 20 INJECTION INTRAMUSCULAR; INTRAVENOUS at 03:43

## 2022-08-07 RX ADMIN — LOSARTAN POTASSIUM 50 MG: 50 TABLET, FILM COATED ORAL at 09:38

## 2022-08-07 RX ADMIN — HYDROCHLOROTHIAZIDE 25 MG: 25 TABLET ORAL at 09:39

## 2022-08-07 ASSESSMENT — PAIN SCALES - GENERAL: PAINLEVEL_OUTOF10: 4

## 2022-08-07 ASSESSMENT — PAIN DESCRIPTION - DESCRIPTORS: DESCRIPTORS: ACHING;DISCOMFORT;DULL

## 2022-08-07 ASSESSMENT — PAIN DESCRIPTION - LOCATION: LOCATION: HEAD

## 2022-08-07 NOTE — PROGRESS NOTES
INPATIENT CARDIOLOGY FOLLOW-UP    Name: Lawrence Fulton    Age: 46 y.o. Date of Admission: 8/5/2022  2:00 PM    Date of Service: 8/7/2022    Chief Complaint: Follow-up for HTN urgency    Interim History:  Currently with no chest pain, respiratory distress, or palpitations. Noncompliant taking his anti-HTN medications as an outpatient. SBP > 230 on admission. SR on EKG and telemetry. No new overnight cardiac complaints. Most recent /69.     Review of Systems:   Cardiac: As per HPI  General: No fever, chills  Pulmonary: As per HPI  HEENT: No visual disturbances, difficult swallowing  GI: No nausea, vomiting  : No dysuria, hematuria  Endocrine: No thyroid disease or DM  Musculoskeletal: TILLMAN x 4, no focal motor deficits  Skin: Intact, no rashes  Neuro: No headache, seizures  Psych: Currently with no depression, anxiety    Problem List:  Patient Active Problem List   Diagnosis    HTN (hypertension)    Continuous tobacco abuse    Obesity with body mass index of 30.0-39.9    Hypertensive urgency    Abnormal EKG    LVH (left ventricular hypertrophy)    Mixed hyperlipidemia       Allergies:  No Known Allergies    Current Medications:  Current Facility-Administered Medications   Medication Dose Route Frequency Provider Last Rate Last Admin    perflutren lipid microspheres (DEFINITY) injection 1.65 mg  1.5 mL IntraVENous ONCE PRN [facility] ERICK Pulido        losartan (COZAAR) tablet 50 mg  50 mg Oral Daily ANTHONY Reeves CNP   50 mg at 08/07/22 0938    ketorolac (TORADOL) injection 30 mg  30 mg IntraVENous Q6H PRN ANTHONY Reeves CNP        atorvastatin (LIPITOR) tablet 20 mg  20 mg Oral Nightly Jimi Jerez MD   20 mg at 08/06/22 2031    hydroCHLOROthiazide (HYDRODIURIL) tablet 25 mg  25 mg Oral Daily ANTHONY Reeves CNP   25 mg at 08/07/22 0939    sodium chloride flush 0.9 % injection 5-40 mL  5-40 mL IntraVENous 2 times per day ANTHONY Sevilla CNP   10 mL at 08/07/22 9262 sodium chloride flush 0.9 % injection 5-40 mL  5-40 mL IntraVENous PRN Anat Nett, APRN - CNP        0.9 % sodium chloride infusion   IntraVENous PRN Anat Nett, APRN - CNP        ondansetron (ZOFRAN-ODT) disintegrating tablet 4 mg  4 mg Oral Q8H PRN Anat Nett, APRN - CNP        Or    ondansetron (ZOFRAN) injection 4 mg  4 mg IntraVENous Q6H PRN Anat Nett, APRN - CNP        acetaminophen (TYLENOL) tablet 650 mg  650 mg Oral Q6H PRN Anat Nett, APRN - CNP   650 mg at 08/07/22 0955    Or    acetaminophen (TYLENOL) suppository 650 mg  650 mg Rectal Q6H PRN Anat Nett, APRN - CNP        polyethylene glycol (GLYCOLAX) packet 17 g  17 g Oral Daily PRN Anat Nett, APRN - CNP        aspirin chewable tablet 81 mg  81 mg Oral Daily Anat Nett, APRN - CNP   81 mg at 08/07/22 0939    nitroGLYCERIN (NITROSTAT) SL tablet 0.4 mg  0.4 mg SubLINGual Q5 Min PRN Anat Nett, APRN - CNP        enoxaparin (LOVENOX) injection 40 mg  40 mg SubCUTAneous Daily Anat Nett, APRN - CNP   40 mg at 08/07/22 0939    hydrALAZINE (APRESOLINE) injection 10 mg  10 mg IntraVENous Q6H PRN Anat Nett, APRN - CNP   10 mg at 08/07/22 0343    amLODIPine (NORVASC) tablet 5 mg  5 mg Oral Daily Anat Nett, APRN - CNP   5 mg at 08/07/22 0939      sodium chloride         Physical Exam:  BP (!) 151/69   Pulse 79   Temp 98.4 °F (36.9 °C) (Oral)   Resp 18   Ht 5' 7\" (1.702 m)   Wt 244 lb (110.7 kg)   SpO2 97%   BMI 38.22 kg/m²   Wt Readings from Last 3 Encounters:   08/07/22 244 lb (110.7 kg)   09/09/19 226 lb (102.5 kg)   02/28/18 240 lb (108.9 kg)     Appearance: Awake, alert, no acute respiratory distress  Skin: Intact, no rash  Head: Normocephalic, atraumatic  Eyes: EOMI, no conjunctival erythema  ENMT: No pharyngeal erythema, MMM, no rhinorrhea  Neck: Supple, no elevated JVP, no carotid bruits  Lungs: Clear to auscultation bilaterally. No wheezes, rales, or rhonchi.   Cardiac: Regular rate and rhythm, +S1S2, no murmurs apparent  Abdomen: Soft, nontender, +bowel sounds  Extremities: Moves all extremities x 4, no lower extremity edema  Neurologic: No focal motor deficits apparent, normal mood and affect       Intake/Output:  No intake or output data in the 24 hours ending 08/07/22 1239  No intake/output data recorded. Laboratory Tests:  Recent Labs     08/05/22  1424      K 4.0      CO2 24   BUN 11   CREATININE 1.2   GLUCOSE 102*   CALCIUM 9.4     Lab Results   Component Value Date/Time    MG 2.1 08/05/2022 02:24 PM     Recent Labs     08/05/22  1424   ALKPHOS 104   ALT 33   AST 23   PROT 7.7   BILITOT 0.4   LABALBU 4.4     Recent Labs     08/05/22  1424 08/06/22  0454   WBC 10.2 8.4   RBC 4.83 4.99   HGB 14.7 15.3   HCT 42.5 44.2   MCV 88.0 88.6   MCH 30.4 30.7   MCHC 34.6* 34.6*   RDW 13.6 13.7    271   MPV 10.1 10.5     No results found for: CKTOTAL, CKMB, CKMBINDEX, TROPONINI  Recent Labs     08/05/22  1424 08/05/22  1749 08/06/22  0454   TROPHS <6 <6 <6     No results found for: INR, PROTIME  No results found for: TSHFT4, TSH  No results found for: LABA1C  No results found for: EAG  Lab Results   Component Value Date    CHOL 219 (H) 08/06/2022     Lab Results   Component Value Date    TRIG 117 08/06/2022     Lab Results   Component Value Date    HDL 38 08/06/2022     Lab Results   Component Value Date    LDLCALC 158 (H) 08/06/2022     Lab Results   Component Value Date    LABVLDL 23 08/06/2022     No results found for: CHOLHDLRATIO  Recent Labs     08/05/22  1424   PROBNP 74       Cardiac Tests:  EKG personally reviewed: SR, rate 63, LVH and secondary STT changes     Telemetry personally reviewed (date: 8/7/2022): SR, rate 70's     Echocardiogram reviewed: 8/6/22   Normal left ventricular systolic function. Ejection fraction is visually estimated at 70%. Moderate concentric left ventricular hypertrophy.    Normal right ventricular size and function (TAPSE 3.1 cm).   No evidence of hemodynamically significant valve disease.     - 's on admission / most recent 's  - Will cancel exercise nuclear stress test  - Will order echocardiogram  - Will order renal artery doppler  - Prior negative sleep study per patient (consider repeat sleep study if no recent study)  - Anti-HTN regimen restarted this admission (continue to up-titrate as needed)  - Aggressive risk factor modifications / counseled re: tobacco cessation  - Telemetry reviewed (SR, no new arrhythmias)    Echocardiogram reviewed: 8/6/22   Normal left ventricular systolic function. Ejection fraction is visually estimated at 70%. Moderate concentric left ventricular hypertrophy. Normal right ventricular size and function (TAPSE 3.1 cm). No evidence of hemodynamically significant valve disease.     ASSESSMENT / PLAN:  HTN urgency -- BP control improved  HLD  BMI 39  Continued tobacco abuse  Anxiety  Medical non-compliance (he ran out of his medications ~ 3 weeks ago; he needs to establish with a new PCP)  Moderate LVH    - 's on admission / most recent /69  - Results of 8/6/22 echocardiogram reviewed with the patient today  - 8/6/22 renal artery doppler negative for renal artery stenosis  - Prior negative sleep study per patient (consider repeat sleep study if no recent study)  - Anti-HTN regimen restarted this admission (continue to up-titrate as needed)  - Aggressive risk factor modifications / counseled re: tobacco cessation  - Telemetry reviewed (SR, no new arrhythmias)  - Ok to discharge from a cardiology standpoint  - Case discussed with the patient and his family today    Patel Newton MD  [facility]) Cardiology

## 2022-08-07 NOTE — DISCHARGE SUMMARY
was admitted for telemetry monitoring, cardiology consultation and management of hypertensive urgency. Treated with several doses IV hydralazine, BP improved he was resumed on Losartan and HCTZ, also started Amlodipine. ECHO indicated LVEF of 70% and LVH  Renal artery sono negative for stenosis. Renal function per chemistry normal.    Bp improved to 151/69 on day of discharge - he is to continue medications as outlined below. Establish with new local PCP. Encouraged to reduce intake of caffeine, energy drinks, and abstain from tobacco.     Discharge Exam:    General Appearance: alert and oriented to person, place and time and in no acute distress  Skin: warm and dry  Head: normocephalic and atraumatic  Eyes: pupils equal, round, and reactive to light, extraocular eye movements intact, conjunctivae normal  Neck: neck supple and non tender without mass   Pulmonary/Chest: clear to auscultation bilaterally- no wheezes, rales or rhonchi, normal air movement, no respiratory distress  Cardiovascular: normal rate, normal S1 and S2 and no carotid bruits  Abdomen: soft, non-tender, non-distended, normal bowel sounds, no masses or organomegaly  Extremities: no cyanosis, no clubbing and no edema  Neurologic: no cranial nerve deficit and speech normal    No intake/output data recorded. No intake/output data recorded. LABS:  Recent Labs     08/05/22  1424      K 4.0      CO2 24   BUN 11   CREATININE 1.2   GLUCOSE 102*   CALCIUM 9.4       Recent Labs     08/05/22  1424 08/06/22  0454   WBC 10.2 8.4   RBC 4.83 4.99   HGB 14.7 15.3   HCT 42.5 44.2   MCV 88.0 88.6   MCH 30.4 30.7   MCHC 34.6* 34.6*   RDW 13.6 13.7    271   MPV 10.1 10.5       No results for input(s): POCGLU in the last 72 hours.     Imaging:  Echo Complete    Result Date: 8/6/2022  Transthoracic Echocardiography Report (TTE)  Demographics   Patient Name       Teresa Mitchell  Gender               Male                     ANKUR   Medical Record     [id number]          Room Number          6939  Number   Account #          [de-identified]         Procedure Date       08/06/2022   Corporate ID                         Ordering Physician   Ayad Mccord MD   Accession Number   [id number]        Referring Physician   Date of Birth      1980        Sonographer          Viktoria GUZMAN   Age                46 year(s)        Interpreting         Ayad Mccord MD                                       Physician                                        Any Other  Procedure Type of Study   TTE procedure:Echo Complete W/Doppler & Color Flow. Procedure Date Date: 08/06/2022 Start: 12:02 PM Study Location: Portable Technical Quality: Adequate visualization Indications:Hypertension. Patient Status: Routine Height: 67 inches Weight: 248 pounds BSA: 2.22 m^2 BMI: 38.84 kg/m^2 HR: 72 bpm BP: 175/89 mmHg  Findings   Left Ventricle  Left ventricle size is normal.  Moderate concentric left ventricular hypertrophy. Normal left ventricular systolic function. Ejection fraction is visually estimated at 70%. Indeterminate diastolic function. Right Ventricle  Normal right ventricular size and function (TAPSE 3.1 cm). Left Atrium  Normal sized left atrium by volume index. Right Atrium  Normal sized right atrium. Mitral Valve  Physiologic and/or trace mitral regurgitation. No evidence of hemodynamically significant mitral stenosis. Tricuspid Valve  Physiologic and/or trace tricuspid regurgitation. Unable to estimate PASP due to incomplete tricuspid regurgitation  envelope. Aortic Valve  No evidence of hemodynamically significant aortic regurgitation or  stenosis. Pulmonic Valve  The pulmonic valve was not well visualized. Pericardial Effusion  No evidence of a hemodynamically significant pericardial effusion. Aorta  Aortic root dimension within normal limits. The inferior vena cava is normal in size with normal respiratory  variation.    Conclusions   Summary Normal left ventricular systolic function. Ejection fraction is visually estimated at 70%. Moderate concentric left ventricular hypertrophy. Normal right ventricular size and function (TAPSE 3.1 cm). No evidence of hemodynamically significant valve disease.    Signature   ----------------------------------------------------------------  Electronically signed by Elisa Marshall MD(Interpreting  physician) on 08/06/2022 01:56 PM  ----------------------------------------------------------------  M-Mode/2D Measurements & Calculations   LV Diastolic    LV Systolic Dimension: 2.7   AV Cusp Separation: 1.9 cmLA  Dimension: 4.7  cm                           Dimension: 4.4 cmAO Root  cm              LV Volume Diastolic: 586.8   Dimension: 3 cm  LV FS:42.6 %    ml  LV PW           LV Volume Systolic: 24.5 ml  Diastolic: 1.6  LV EDV/LV EDV Index: 100.1  cm              ml/45 ml/m^2LV ESV/LV ESV  LV PW Systolic: Index: 04.0 QE/13WD/ m^2     LA/Aorta: 1.47  2 cm            EF Calculated: 71.8 %        Ascending Aorta: 3 cm  Septum          LV Mass Index: 146 l/min*m^2 LA volume/Index: 64 ml  Diastolic: 1.6                               /29ml/m^2  cm                                           RA Area: 15.4 cm^2  Septum          LVOT: 2 cm  Systolic: 2 cm                               IVC Expiration: 1.6 cm  CO: 6.81 l/min  CI: 3.07  l/m*m^2  LV Mass: 324.43  g  Doppler Measurements & Calculations   MV Peak E-Wave: 0.83 AV Peak Velocity: 1.72 LVOT Peak Velocity: 1.6 m/s  m/s                  m/s                    LVOT Mean Velocity: 1.07 m/s  MV Peak A-Wave: 0.76 AV Peak Gradient:      LVOT Peak Gradient: 10.3  m/s                  11.82 mmHg             mmHgLVOT Mean Gradient: 5.4  MV E/A Ratio: 1.09   AV Mean Velocity: 1.16 mmHg  MV Peak Gradient:    m/s  4.2 mmHg             AV Mean Gradient: 6.2  MV Mean Gradient:    mmHg  2.1 mmHg             AV VTI: 30.6 cm  MV Mean Velocity:    AV Area  0.66 m/s (Continuity):3.09 cm^2 PV Peak Velocity: 1.35 m/s  MV Deceleration                             PV Peak Gradient: 7.3 mmHg  Time: 195.1 msec     LVOT VTI: 30.1 cm      PV Mean Velocity: 1.01 m/s  MV P1/2t: 68 msec    IVRT: 106.1 msec       PV Mean Gradient: 4.5 mmHg  MVA by PHT:3.24 cm^2  MV Area  (continuity): 3.5  cm^2  MV E' Septal  Velocity: 0.07 m/s  MV E' Lateral  Velocity: 8 m/s  [email or web address] [id number]    CT HEAD WO CONTRAST    Result Date: 8/5/2022  EXAMINATION: CT OF THE HEAD WITHOUT CONTRAST  8/5/2022 2:59 pm TECHNIQUE: CT of the head was performed without the administration of intravenous contrast. Automated exposure control, iterative reconstruction, and/or weight based adjustment of the mA/kV was utilized to reduce the radiation dose to as low as reasonably achievable. COMPARISON: April 10, 2006 HISTORY: ORDERING SYSTEM PROVIDED HISTORY: hypertension dizziness TECHNOLOGIST PROVIDED HISTORY: Has a \"code stroke\" or \"stroke alert\" been called? ->No Reason for exam:->hypertension dizziness Decision Support Exception - unselect if not a suspected or confirmed emergency medical condition->Emergency Medical Condition (MA) FINDINGS: BRAIN/VENTRICLES: There is no acute intracranial hemorrhage, mass effect or midline shift. No abnormal extra-axial fluid collection. The gray-white differentiation is maintained without evidence of an acute infarct. There is no evidence of hydrocephalus. ORBITS: The visualized portion of the orbits demonstrate no acute abnormality. SINUSES: The visualized paranasal sinuses and mastoid air cells demonstrate no acute abnormality. SOFT TISSUES/SKULL: Irregular areas of attenuation involving the scalp overlying superior left calvarium. 1. No acute intracranial hemorrhage or edema.  2. Irregular areas of attenuation involving the scalp overlying left superior aspect of the calvarium could indicate hematoma and inflammation at site of recent trauma. Clinical correlation recommended. XR CHEST PORTABLE    Result Date: 8/5/2022  EXAMINATION: ONE XRAY VIEW OF THE CHEST 8/5/2022 3:20 pm COMPARISON: February 28, 2018 HISTORY: ORDERING SYSTEM PROVIDED HISTORY: HTN emergency TECHNOLOGIST PROVIDED HISTORY: Reason for exam:->HTN emergency FINDINGS: No airspace opacity or pleural effusion. The heart is normal size. No pneumothorax. No evidence of pneumonia or pleural effusion. US RETROPERITONEAL CAROL    Result Date: 8/6/2022  EXAMINATION: RETROPERITONEAL US8/6/2022 5:57 pm VL RENAL ARTERY US/DOPPLER COMPLETE TECHNIQUE: Color Doppler and spectral waveform analysis COMPARISON: None HISTORY: ORDERING SYSTEM PROVIDED HISTORY: r/o renal artery stenosis TECHNOLOGIST PROVIDED HISTORY: Reason for exam:->r/o renal artery stenosis What reading provider will be dictating this exam?->CRC evaluate for renal artery stenosis FINDINGS: The right and left kidney is 10.9 cm and 10.8 cm in length respectively. Renal cortical thickness and echogenicity is normal and there is no obvious pelvocaliectasis in either kidney. The abdominal aorta shows no significant stenosis or aneurysmal dilatation. The maximum PSV in the mid abdominal aorta is 98 cm/s. Right-sided Doppler evaluation: PSV is 143, 158 and 96 cm/s in the proximal, mid and distal segments of the renal artery. RAR is 1.6. Renal artery shows low resistance waveforms. The renal vein shows normal venous waveforms. Left Doppler evaluation: PSV is 155, 179 and 171 cm/s in the proximal, mid and distal segments of the renal artery. RAR is 1.8. Renal artery shows low resistance waveforms. The renal vein shows normal venous waveforms. There is no evidence of renal artery stenosis on this study.        Patient Instructions:      Medication List        START taking these medications      amLODIPine 5 MG tablet  Commonly known as: NORVASC  Take 1 tablet by mouth in the morning. Start taking on: August 8, 2022     aspirin 81 MG chewable tablet  Take 1 tablet by mouth in the morning. Start taking on: August 8, 2022     atorvastatin 20 MG tablet  Commonly known as: LIPITOR  Take 1 tablet by mouth nightly     losartan-hydroCHLOROthiazide 50-12.5 MG per tablet  Commonly known as: HYZAAR  Take 1 tablet by mouth in the morning.                Where to Get Your Medications        These medications were sent to [address] [place] [address], [address], [address] [place], [place] [id number]      Phone: [phone]   amLODIPine 5 MG tablet  atorvastatin 20 MG tablet  losartan-hydroCHLOROthiazide 50-12.5 MG per tablet       You can get these medications from any pharmacy    You don't need a prescription for these medications  aspirin 81 MG chewable tablet           Note that more than 30 minutes was spent in preparing discharge papers, discussing discharge with patient, medication review, etc.    Signed:  Electronically signed by ANTHONY Zuniga CNP on 8/7/2022 at 10:36 AM

## 2022-08-07 NOTE — PLAN OF CARE
Problem: Pain  Goal: Verbalizes/displays adequate comfort level or baseline comfort level  Outcome: Completed     Problem: Skin/Tissue Integrity  Goal: Absence of new skin breakdown  Description: 1. Monitor for areas of redness and/or skin breakdown  2. Assess vascular access sites hourly  3. Every 4-6 hours minimum:  Change oxygen saturation probe site  4. Every 4-6 hours:  If on nasal continuous positive airway pressure, respiratory therapy assess nares and determine need for appliance change or resting period.   Outcome: Completed     Problem: ABCDS Injury Assessment  Goal: Absence of physical injury  Outcome: Completed

## 2022-08-08 LAB
EKG ATRIAL RATE: 63 BPM
EKG P AXIS: 38 DEGREES
EKG P-R INTERVAL: 162 MS
EKG Q-T INTERVAL: 450 MS
EKG QRS DURATION: 96 MS
EKG QTC CALCULATION (BAZETT): 460 MS
EKG R AXIS: -22 DEGREES
EKG T AXIS: 161 DEGREES
EKG VENTRICULAR RATE: 63 BPM

## 2022-08-16 ENCOUNTER — TELEPHONE (OUTPATIENT)
Dept: CARDIOLOGY CLINIC | Age: 42
End: 2022-08-16

## 2022-10-26 ENCOUNTER — HOSPITAL ENCOUNTER (EMERGENCY)
Age: 42
Discharge: HOME | End: 2022-10-26
Attending: STUDENT IN AN ORGANIZED HEALTH CARE EDUCATION/TRAINING PROGRAM
Payer: COMMERCIAL

## 2022-10-26 VITALS
RESPIRATION RATE: 18 BRPM | DIASTOLIC BLOOD PRESSURE: 100 MMHG | TEMPERATURE: 98.2 F | BODY MASS INDEX: 37.67 KG/M2 | HEIGHT: 67 IN | WEIGHT: 240 LBS | HEART RATE: 66 BPM | SYSTOLIC BLOOD PRESSURE: 173 MMHG | OXYGEN SATURATION: 97 %

## 2022-10-26 DIAGNOSIS — U07.1 COVID-19: Primary | ICD-10-CM

## 2022-10-26 LAB — SARS-COV-2, NAAT: DETECTED

## 2022-10-26 PROCEDURE — 99283 EMERGENCY DEPT VISIT LOW MDM: CPT

## 2022-10-26 PROCEDURE — 87635 SARS-COV-2 COVID-19 AMP PRB: CPT

## 2022-10-26 RX ORDER — CITALOPRAM 10 MG/1
10 TABLET ORAL DAILY
COMMUNITY

## 2022-10-26 RX ORDER — LOSARTAN POTASSIUM AND HYDROCHLOROTHIAZIDE 25; 100 MG/1; MG/1
1 TABLET ORAL DAILY
COMMUNITY

## 2022-10-26 ASSESSMENT — ENCOUNTER SYMPTOMS
EYE DISCHARGE: 0
RHINORRHEA: 1
COUGH: 1
EYE PAIN: 0
SORE THROAT: 0
NAUSEA: 0
BACK PAIN: 0
ABDOMINAL PAIN: 0
EYE REDNESS: 0
DIARRHEA: 0
WHEEZING: 0
SINUS PRESSURE: 0
VOMITING: 0
SHORTNESS OF BREATH: 0

## 2022-10-26 NOTE — ED NOTES
Pt ambulated with pulse oximeter  Pulse oximeter remained at 97% throughout  Heart rate remained in [address]  10/26/22 5825

## 2022-10-26 NOTE — DISCHARGE INSTRUCTIONS
Thank you for the opportunity to serve in your medical care today. Follow up with your doctor is critical for optimal healing. Should you have any new or worsening symptoms, please return to the Emergency Department for further work-up and evaluation.

## 2022-10-26 NOTE — ED PROVIDER NOTES
Department of Emergency Medicine   ED  Provider Note  Admit Date/RoomTime: 10/26/2022  7:38 AM  ED Room: 03/03              Ayad ANKUR Nova is a 46 y.o. male with a PMHx significant for HTN, HLD who presents for evaluation of concern for covid, beginning prior to arrival.  The complaint has been persistent, moderate in severity, and worsened by nothing. The patient states that last week he had a cough, congestion and runny nose. Notes that his girlfriend tested positive for covid yesterday and he is concerned that he might have it. He is presenting for evaluation for a COVID test. Cough is productive of clear mucus. Patient denies any chest pain, shortness of breath, nausea, vomiting, diarrhea, abdominal pain. Patient states he is on losartan, but hasn't been taking it as prescribed. He did take it yesterday. The history is provided by the patient and medical records. Review of Systems   Constitutional:  Negative for chills and fever. HENT:  Positive for congestion and rhinorrhea. Negative for ear pain, sinus pressure and sore throat. Eyes:  Negative for pain, discharge and redness. Respiratory:  Positive for cough. Negative for shortness of breath and wheezing. Cardiovascular:  Negative for chest pain. Gastrointestinal:  Negative for abdominal pain, diarrhea, nausea and vomiting. Genitourinary:  Negative for dysuria and frequency. Musculoskeletal:  Negative for arthralgias and back pain. Skin:  Negative for rash and wound. Neurological:  Negative for weakness and headaches. Hematological:  Negative for adenopathy. All other systems reviewed and are negative. Physical Exam  Vitals and nursing note reviewed. Constitutional:       General: He is not in acute distress. Appearance: Normal appearance. He is well-developed. He is not ill-appearing. HENT:      Head: Normocephalic and atraumatic.       Right Ear: External ear normal.      Left Ear: External ear normal. Eyes:      General:         Right eye: No discharge. Left eye: No discharge. Extraocular Movements: Extraocular movements intact. Conjunctiva/sclera: Conjunctivae normal.   Cardiovascular:      Rate and Rhythm: Normal rate and regular rhythm. Heart sounds: Normal heart sounds. No murmur heard. Pulmonary:      Effort: Pulmonary effort is normal. No respiratory distress. Breath sounds: Normal breath sounds. No stridor. Abdominal:      General: There is no distension. Palpations: Abdomen is soft. There is no mass. Tenderness: There is no abdominal tenderness. Hernia: No hernia is present. Musculoskeletal:      Cervical back: Normal range of motion and neck supple. Right lower leg: No edema. Left lower leg: No edema. Skin:     General: Skin is warm and dry. Coloration: Skin is not jaundiced or pale. Neurological:      General: No focal deficit present. Mental Status: He is alert and oriented to person, place, and time. MDM  Patient is a 42-year-old male with medical history of hypertension, obesity and hyperlipidemia presenting the emergency department due to [place] exposure. Patient states that he has been around his girlfriend who just tested positive for COVID. Initial evaluation, patient is nontoxic-appearing, afebrile, hemodynamically stable in no acute distress. Physical exam essentially benign. Initial blood pressure elevated at 182/107 but remaining vitals within normal limits. Patient does admit that he has not been taking his antihypertensive medications as prescribed. He last took his losartan yesterday. Work-up in the emergency department significant for COVID-positive test.  He was ambulated in the ED remained asymptomatic with SpO2 above 95%. Work-up and results discussed with patient and he was agreeable to discharge with outpatient follow-up as needed.   Patient given return precautions in case of new or worsening symptoms. Vies to continue with symptomatic and supportive care at home for symptoms. Repeat vitals with improvement in blood pressure to 173/100. Patient discharged in stable condition. ED Course as of 10/26/22 0926   Wed Oct 26, 2022   0920 Blood pressure marginally improved compared to initial at 173/100. Patient does report that he did not take his blood pressure medication this morning and was coming off from work and took a 5-hour energy. Patient remains asymptomatic is complaining of no headache, lightheadedness, chest pain or shortness of breath, abdominal pain or flank pain. [PP]      ED Course User Index  [PP] Charlotte Carty, DO      --------------------------------------------- PAST HISTORY ---------------------------------------------  Past Medical History:  has a past medical history of Hypertension. Past Surgical History:  has no past surgical history on file. Social History:  reports that he has been smoking cigarettes. He has been smoking an average of .5 packs per day. He has never used smokeless tobacco. He reports current alcohol use. He reports that he does not use drugs. Family History: family history includes Birth Defects in his brother; Cancer in his paternal grandmother; Diabetes in his father, maternal aunt, maternal grandfather, maternal grandmother, maternal uncle, and paternal grandmother; Heart Disease in his father and paternal grandmother; High Blood Pressure in his father, maternal aunt, maternal grandfather, maternal grandmother, maternal uncle, mother, and paternal grandmother; Stroke in his maternal aunt, maternal grandfather, and maternal grandmother. The patients home medications have been reviewed. Allergies: Patient has no known allergies.     -------------------------------------------------- RESULTS -------------------------------------------------  Labs:  Results for orders placed or performed during the hospital encounter of 10/26/22   COVID-19, Rapid    Specimen: Nasopharyngeal Swab   Result Value Ref Range    SARS-CoV-2, NAAT DETECTED (A) Not Detected       Radiology:  No orders to display       ------------------------- NURSING NOTES AND VITALS REVIEWED ---------------------------  Date / Time Roomed:  10/26/2022  7:38 AM  ED Bed Assignment:  03/03    The nursing notes within the ED encounter and vital signs as below have been reviewed. BP (!) 173/100   Pulse 66   Temp 98.2 °F (36.8 °C) (Oral)   Resp 18   Ht 5' 7\" (1.702 m)   Wt 240 lb (108.9 kg)   SpO2 97%   BMI 37.59 kg/m²   Oxygen Saturation Interpretation: Normal      ------------------------------------------ PROGRESS NOTES ------------------------------------------  I have spoken with the patient and discussed todays results, in addition to providing specific details for the plan of care and counseling regarding the diagnosis and prognosis. Their questions are answered at this time and they are agreeable with the plan. I discussed at length with them reasons for immediate return here for re evaluation. They will followup with primary care by calling their office tomorrow. --------------------------------- ADDITIONAL PROVIDER NOTES ---------------------------------  At this time the patient is without objective evidence of an acute process requiring hospitalization or inpatient management. They have remained hemodynamically stable throughout their entire ED visit and are stable for discharge with outpatient follow-up. The plan has been discussed in detail and they are aware of the specific conditions for emergent return, as well as the importance of follow-up. New Prescriptions    No medications on file       Diagnosis:  1. COVID-19        Disposition:  Patient's disposition: Discharge to home  Patient's condition is stable.            Pratima Armas DO  Resident  10/26/22 [id number]

## 2022-11-18 ENCOUNTER — OFFICE VISIT (OUTPATIENT)
Dept: CARDIOLOGY CLINIC | Age: 42
End: 2022-11-18
Payer: COMMERCIAL

## 2022-11-18 VITALS
HEIGHT: 67 IN | WEIGHT: 250.9 LBS | HEART RATE: 76 BPM | BODY MASS INDEX: 39.38 KG/M2 | DIASTOLIC BLOOD PRESSURE: 86 MMHG | SYSTOLIC BLOOD PRESSURE: 160 MMHG

## 2022-11-18 DIAGNOSIS — Z72.0 TOBACCO ABUSE: ICD-10-CM

## 2022-11-18 DIAGNOSIS — E78.2 MIXED HYPERLIPIDEMIA: ICD-10-CM

## 2022-11-18 DIAGNOSIS — I10 PRIMARY HYPERTENSION: Primary | ICD-10-CM

## 2022-11-18 DIAGNOSIS — I51.7 LVH (LEFT VENTRICULAR HYPERTROPHY): ICD-10-CM

## 2022-11-18 PROCEDURE — 93000 ELECTROCARDIOGRAM COMPLETE: CPT | Performed by: INTERNAL MEDICINE

## 2022-11-18 PROCEDURE — 99214 OFFICE O/P EST MOD 30 MIN: CPT | Performed by: INTERNAL MEDICINE

## 2022-11-18 PROCEDURE — 3078F DIAST BP <80 MM HG: CPT | Performed by: INTERNAL MEDICINE

## 2022-11-18 PROCEDURE — 3075F SYST BP GE 130 - 139MM HG: CPT | Performed by: INTERNAL MEDICINE

## 2022-11-18 RX ORDER — IBUPROFEN 800 MG/1
TABLET ORAL
COMMUNITY
Start: 2022-09-12

## 2022-11-18 RX ORDER — FLUTICASONE PROPIONATE 50 MCG
SPRAY, SUSPENSION (ML) NASAL
COMMUNITY
Start: 2022-08-23

## 2022-11-18 NOTE — PROGRESS NOTES
OUTPATIENT CARDIOLOGY FOLLOW-UP    Name: Marcelle Preston    Age: 46 y.o. Date of Service: 11/18/2022    Chief Complaint: Follow-up for HTN urgency    Interim History:  He denies recent chest pain, SOB, or palpitations. Noncompliant taking his anti-HTN medications as an outpatient prior to 8/2022 hospitalization (SBP > 230 on admission at that time). SR on EKG. No new cardiac complaints since 8/2022 hospital discharge. Most recent SBP ~ 150's on home monitoring. +COVID-19 infection earlier this month. Review of Systems:   Cardiac: As per HPI  General: No fever, chills  Pulmonary: As per HPI  HEENT: No visual disturbances, difficult swallowing  GI: No nausea, vomiting  : No dysuria, hematuria  Endocrine: No thyroid disease or DM  Musculoskeletal: TILLMAN x 4, no focal motor deficits  Skin: Intact, no rashes  Neuro: No headache, seizures  Psych: Currently with no depression, anxiety    Problem List:  Patient Active Problem List   Diagnosis    HTN (hypertension)    Continuous tobacco abuse    Obesity with body mass index of 30.0-39.9    Hypertensive urgency    Abnormal EKG    LVH (left ventricular hypertrophy)    Mixed hyperlipidemia       Allergies:  No Known Allergies    Current Medications:  Current Outpatient Medications   Medication Sig Dispense Refill    fluticasone (FLONASE) 50 MCG/ACT nasal spray instill 2 sprays into each nostril daily      ibuprofen (ADVIL;MOTRIN) 800 MG tablet take 1 tablet by mouth once daily      losartan-hydroCHLOROthiazide (HYZAAR) 100-25 MG per tablet Take 1 tablet by mouth daily      aspirin 81 MG chewable tablet Take 1 tablet by mouth in the morning. 30 tablet 3    citalopram (CELEXA) 10 MG tablet Take 10 mg by mouth daily (Patient not taking: Reported on 11/18/2022)       No current facility-administered medications for this visit.      REM      Physical Exam:  BP (!) 160/86   Pulse 76   Ht 5' 7\" (1.702 m)   Wt 250 lb 14.4 oz (113.8 kg)   BMI 39.30 kg/m²   Wt Readings from Last 3 Encounters:   11/18/22 250 lb 14.4 oz (113.8 kg)   10/26/22 240 lb (108.9 kg)   08/07/22 244 lb (110.7 kg)     Appearance: Awake, alert, no acute respiratory distress  Skin: Intact, no rash  Head: Normocephalic, atraumatic  Eyes: EOMI, no conjunctival erythema  ENMT: No pharyngeal erythema, MMM, no rhinorrhea  Neck: Supple, no elevated JVP, no carotid bruits  Lungs: Clear to auscultation bilaterally. No wheezes, rales, or rhonchi. Cardiac: Regular rate and rhythm, +S1S2, no murmurs apparent  Abdomen: Soft, nontender, +bowel sounds  Extremities: Moves all extremities x 4, no lower extremity edema  Neurologic: No focal motor deficits apparent, normal mood and affect       Intake/Output:  No intake or output data in the 24 hours ending 11/18/22 1529  No intake/output data recorded. Laboratory Tests:  No results for input(s): NA, K, CL, CO2, BUN, CREATININE, GLUCOSE, CALCIUM in the last 72 hours. Lab Results   Component Value Date/Time    MG 2.1 08/05/2022 02:24 PM     No results for input(s): ALKPHOS, ALT, AST, PROT, BILITOT, BILIDIR, LABALBU in the last 72 hours. No results for input(s): WBC, RBC, HGB, HCT, MCV, MCH, MCHC, RDW, PLT, MPV in the last 72 hours. No results found for: CKTOTAL, CKMB, CKMBINDEX, TROPONINI  No results for input(s): CKTOTAL, CKMB, CKMBINDEX, TROPHS in the last 72 hours. No results found for: INR, PROTIME  No results found for: TSHFT4, TSH  No results found for: LABA1C  No results found for: EAG  Lab Results   Component Value Date    CHOL 219 (H) 08/06/2022     Lab Results   Component Value Date    TRIG 117 08/06/2022     Lab Results   Component Value Date    HDL 38 08/06/2022     Lab Results   Component Value Date    LDLCALC 158 (H) 08/06/2022     Lab Results   Component Value Date    LABVLDL 23 08/06/2022     No results found for: CHOLHDLRATIO  No results for input(s): PROBNP in the last 72 hours.       Cardiac Tests:  EKG personally reviewed: SR, rate 76, LVH and secondary STT changes     Telemetry personally reviewed (date: 11/18/2022): SR, rate 70's     Echocardiogram reviewed: 8/6/22   Normal left ventricular systolic function. Ejection fraction is visually estimated at 70%. Moderate concentric left ventricular hypertrophy. Normal right ventricular size and function (TAPSE 3.1 cm). No evidence of hemodynamically significant valve disease. ASSESSMENT / PLAN:  Evaluation for HTN urgency in 8/2022 (noncompliant taking his anti-HTN medications as an outpatient prior to 8/2022 hospitalization -- SBP > 230 on admission at that time --> most recent SBP ~ 150's on home monitoring)  HLD  BMI 39  Continued tobacco abuse -- currently 0.5 PPD  Anxiety  Medical non-compliance prior to 8/2022 hospitalization (he ran out of his medications ~ 3 weeks ago prior to admission -- he recently established with a new PCP;  Dr. Lisa Durán)  Moderate LVH  COVID-19 infection in 11/2022    - Restart norvasc  - Continue ARB/HCTZ  - Continue home BP monitoring  - Results of 8/6/22 echocardiogram reviewed with the patient again today  - 8/6/22 renal artery doppler negative for renal artery stenosis  - Prior negative sleep study per patient (consider repeat sleep study if no recent study)  - Aggressive risk factor modifications / counseled re: tobacco cessation  - [address] Stanley Pratt MD  [facility]) Cardiology

## 2022-12-06 ENCOUNTER — APPOINTMENT (OUTPATIENT)
Dept: GENERAL RADIOLOGY | Age: 42
End: 2022-12-06
Payer: COMMERCIAL

## 2022-12-06 ENCOUNTER — HOSPITAL ENCOUNTER (EMERGENCY)
Age: 42
Discharge: HOME | End: 2022-12-07
Attending: EMERGENCY MEDICINE
Payer: COMMERCIAL

## 2022-12-06 VITALS
RESPIRATION RATE: 16 BRPM | SYSTOLIC BLOOD PRESSURE: 185 MMHG | TEMPERATURE: 99.2 F | HEART RATE: 91 BPM | OXYGEN SATURATION: 98 % | WEIGHT: 25 LBS | BODY MASS INDEX: 3.92 KG/M2 | DIASTOLIC BLOOD PRESSURE: 88 MMHG

## 2022-12-06 DIAGNOSIS — R05.2 SUBACUTE COUGH: ICD-10-CM

## 2022-12-06 DIAGNOSIS — J10.1 INFLUENZA A: Primary | ICD-10-CM

## 2022-12-06 DIAGNOSIS — R03.0 ELEVATED BLOOD PRESSURE READING: ICD-10-CM

## 2022-12-06 LAB
INFLUENZA A BY PCR: DETECTED
INFLUENZA B BY PCR: NOT DETECTED

## 2022-12-06 PROCEDURE — 87502 INFLUENZA DNA AMP PROBE: CPT

## 2022-12-06 PROCEDURE — 71046 X-RAY EXAM CHEST 2 VIEWS: CPT

## 2022-12-06 PROCEDURE — 6370000000 HC RX 637 (ALT 250 FOR IP): Performed by: EMERGENCY MEDICINE

## 2022-12-06 PROCEDURE — 99284 EMERGENCY DEPT VISIT MOD MDM: CPT

## 2022-12-06 RX ORDER — IBUPROFEN 600 MG/1
600 TABLET ORAL ONCE
Status: DISCONTINUED | OUTPATIENT
Start: 2022-12-06 | End: 2022-12-06

## 2022-12-06 RX ORDER — TRIAMCINOLONE ACETONIDE 40 MG/ML
40 INJECTION, SUSPENSION INTRA-ARTICULAR; INTRAMUSCULAR ONCE
Status: DISCONTINUED | OUTPATIENT
Start: 2022-12-06 | End: 2022-12-06

## 2022-12-06 RX ORDER — LORATADINE 10 MG
1 CAPSULE ORAL 2 TIMES DAILY PRN
Qty: 30 CAPSULE | Refills: 0 | Status: SHIPPED | OUTPATIENT
Start: 2022-12-06

## 2022-12-06 RX ORDER — IPRATROPIUM BROMIDE AND ALBUTEROL SULFATE 2.5; .5 MG/3ML; MG/3ML
1 SOLUTION RESPIRATORY (INHALATION) ONCE
Status: COMPLETED | OUTPATIENT
Start: 2022-12-06 | End: 2022-12-06

## 2022-12-06 RX ORDER — ALBUTEROL SULFATE 90 UG/1
2 AEROSOL, METERED RESPIRATORY (INHALATION) EVERY 6 HOURS PRN
Qty: 18 G | Refills: 0 | Status: SHIPPED | OUTPATIENT
Start: 2022-12-06 | End: 2022-12-13

## 2022-12-06 RX ORDER — OSELTAMIVIR PHOSPHATE 75 MG/1
75 CAPSULE ORAL 2 TIMES DAILY
Qty: 20 CAPSULE | Refills: 0 | Status: SHIPPED | OUTPATIENT
Start: 2022-12-06 | End: 2022-12-11

## 2022-12-06 RX ORDER — ACETAMINOPHEN 500 MG
1000 TABLET ORAL ONCE
Status: DISCONTINUED | OUTPATIENT
Start: 2022-12-06 | End: 2022-12-06

## 2022-12-06 RX ORDER — OXYCODONE HYDROCHLORIDE AND ACETAMINOPHEN 5; 325 MG/1; MG/1
1 TABLET ORAL ONCE
Status: COMPLETED | OUTPATIENT
Start: 2022-12-06 | End: 2022-12-06

## 2022-12-06 RX ORDER — GUAIFENESIN/DEXTROMETHORPHAN 100-10MG/5
10 SYRUP ORAL ONCE
Status: COMPLETED | OUTPATIENT
Start: 2022-12-06 | End: 2022-12-06

## 2022-12-06 RX ORDER — NAPROXEN 500 MG/1
500 TABLET ORAL 2 TIMES DAILY WITH MEALS
Qty: 20 TABLET | Refills: 0 | Status: SHIPPED | OUTPATIENT
Start: 2022-12-06

## 2022-12-06 RX ADMIN — OXYCODONE AND ACETAMINOPHEN 1 TABLET: 5; 325 TABLET ORAL at 23:33

## 2022-12-06 RX ADMIN — GUAIFENESIN SYRUP AND DEXTROMETHORPHAN 10 ML: 100; 10 SYRUP ORAL at 23:33

## 2022-12-06 RX ADMIN — IPRATROPIUM BROMIDE AND ALBUTEROL SULFATE 1 AMPULE: 2.5; .5 SOLUTION RESPIRATORY (INHALATION) at 23:33

## 2022-12-06 ASSESSMENT — LIFESTYLE VARIABLES
HOW OFTEN DO YOU HAVE A DRINK CONTAINING ALCOHOL: NEVER
HOW MANY STANDARD DRINKS CONTAINING ALCOHOL DO YOU HAVE ON A TYPICAL DAY: PATIENT DOES NOT DRINK

## 2022-12-06 ASSESSMENT — PAIN DESCRIPTION - PAIN TYPE: TYPE: ACUTE PAIN

## 2022-12-06 ASSESSMENT — PAIN SCALES - GENERAL: PAINLEVEL_OUTOF10: 10

## 2022-12-06 ASSESSMENT — PAIN DESCRIPTION - LOCATION: LOCATION: CHEST

## 2022-12-06 ASSESSMENT — PAIN DESCRIPTION - FREQUENCY: FREQUENCY: CONTINUOUS

## 2022-12-06 ASSESSMENT — PAIN - FUNCTIONAL ASSESSMENT: PAIN_FUNCTIONAL_ASSESSMENT: 0-10

## 2022-12-06 ASSESSMENT — PAIN DESCRIPTION - DESCRIPTORS: DESCRIPTORS: DISCOMFORT;SORE

## 2022-12-06 NOTE — Clinical Note
Bianka Bonilla was seen and treated in our emergency department on 12/6/2022. He may return to work on 12/09/2022. If you have any questions or concerns, please don't hesitate to call.       Lena Husain MD

## 2022-12-07 ASSESSMENT — PAIN - FUNCTIONAL ASSESSMENT: PAIN_FUNCTIONAL_ASSESSMENT: NONE - DENIES PAIN

## 2022-12-07 ASSESSMENT — PAIN SCALES - GENERAL: PAINLEVEL_OUTOF10: 0

## 2022-12-07 NOTE — ED NOTES
Pt denies any chest pain. Pt states he has \"rib pain when coughing\".      Home Dutta RN  12/06/22 1764

## 2022-12-07 NOTE — DISCHARGE INSTRUCTIONS
NOTE: Get IMMEDIATE medical attention if any new/worsening symptoms occur. Please see your family doctor in 2-3 days for blood pressure recheck.

## 2022-12-07 NOTE — ED PROVIDER NOTES
HPI:  12/6/22, Time: 10:53 PM NESTOR Nova is a 46 y.o. male presenting to the ED for congestion plus muscle aches plus ribs \" on fire\" with coughing episodes, beginning 2 days ago. The complaint has been persistent, moderate in severity, and worsened by cough. Patient denies any colored sputum production or hemoptysis. No chest heaviness/pressure/tightness. Occasional intermittent rib pain with coughing. No relieving factors reported. No other complaints reported. Review of Systems:   A complete review of systems was performed and pertinent positives and negatives are stated within HPI, all other systems reviewed and are negative.    --------------------------------------------- PAST HISTORY ---------------------------------------------  Past Medical History:  has a past medical history of Hypertension. Past Surgical History:  has no past surgical history on file. Social History:  reports that he has been smoking cigarettes. He has been smoking an average of .5 packs per day. He has never used smokeless tobacco. He reports current alcohol use. He reports that he does not use drugs. Family History: family history includes Birth Defects in his brother; Cancer in his paternal grandmother; Diabetes in his father, maternal aunt, maternal grandfather, maternal grandmother, maternal uncle, and paternal grandmother; Heart Disease in his father and paternal grandmother; High Blood Pressure in his father, maternal aunt, maternal grandfather, maternal grandmother, maternal uncle, mother, and paternal grandmother; Stroke in his maternal aunt, maternal grandfather, and maternal grandmother. The patients home medications have been reviewed. Allergies: Patient has no known allergies.     -------------------------------------------------- RESULTS -------------------------------------------------  All laboratory and radiology results have been personally reviewed by myself LABS:  Results for orders placed or performed during the hospital encounter of 12/06/22   Rapid influenza A/B antigens    Specimen: Nasopharyngeal; Nose   Result Value Ref Range    Influenza A by PCR DETECTED (A) Not Detected    Influenza B by PCR Not Detected Not Detected       RADIOLOGY:  Interpreted by Radiologist.  XR CHEST (2 VW)   Final Result   No acute process. ------------------------- NURSING NOTES AND VITALS REVIEWED ---------------------------  The nursing notes within the ED encounter and vital signs as below have been reviewed. BP (!) 185/88   Pulse 91   Temp 99.2 °F (37.3 °C) (Oral)   Resp 16   Wt 25 lb (11.3 kg)   SpO2 98%   BMI 3.92 kg/m²   Oxygen Saturation Interpretation: Normal    ---------------------------------------------------PHYSICAL EXAM--------------------------------------    Constitutional/General: Alert and oriented x3, well appearing, non toxic in mild distress  Head: Normocephalic and atraumatic  Eyes: PERRL, EOMI  Mouth: Oropharynx clear, handling secretions, no trismus  Neck: Supple, full ROM, no JVD, trachea midline  Pulmonary: Lungs clear to auscultation bilaterally, no wheezes, rales, or rhonchi. Not in respiratory distress  Cardiovascular:  Regular rate and rhythm, no murmurs, gallops, or rubs. 2+ distal pulses  GI: Soft, non tender, non distended, no organomegaly no masses no guarding no rigidity normal bowel sounds  Extremities: Moves all extremities x 4. Warm and well perfused  Skin: warm and dry without rash; no petechia no purpura no target lesions. Neurologic: GCS 15, cranial nerves II through XII grossly intact with no focal deficits. No meningeal signs.   Psych: Normal Affect    ------------------------------ ED COURSE/MEDICAL DECISION MAKING----------------------  Medications   ipratropium-albuterol (DUONEB) nebulizer solution 1 ampule (has no administration in time range)   guaiFENesin-dextromethorphan (ROBITUSSIN DM) 100-10 MG/5ML syrup 10 mL (has no administration in time range)   oxyCODONE-acetaminophen (PERCOCET) 5-325 MG per tablet 1 tablet (has no administration in time range)       ED COURSE:     Medical Decision Making:   Patient denies having a chest heaviness/pressure/tightness, but EKG was now obtained. Shared decision-making was employed and the patient stated that he has been taken over-the-counter cold prep medications which she attributes to his elevated blood pressure readings. He states his baseline systolic blood pressure however is in the 160s. No other complaints are reported. Chest x-ray showed no focal infiltrates. Patient did test positive for influenza A. He was not vaccinated for influenza A this season. Patient is given home-going prescription for Coricidin HBP for cough and congestion he is also given a prescription for Proventil MDI and Tamiflu. He was given Percocet here in the department for pain relief but he states that he wants to go back to work in 2 days so I would not prescribe any long-term narcotic analgesics since he is a . Counseling: The emergency provider has spoken with the patient and discussed todays results, in addition to providing specific details for the plan of care and counseling regarding the diagnosis and prognosis. Questions are answered at this time and they are agreeable with the plan. Controlled Substance Monitoring:  Acute and Chronic Pain Monitoring:   RX Monitoring 12/6/2022   Periodic Controlled Substance Monitoring No signs of potential drug abuse or diversion identified.     --------------------------------- IMPRESSION AND DISPOSITION ---------------------------------    IMPRESSION  1. Influenza A    2. Elevated blood pressure reading    3. Subacute cough        DISPOSITION  Disposition: Discharge to home  Patient condition is stable      NOTE: This report was transcribed using voice recognition software.  Every effort was made to ensure accuracy; however, inadvertent computerized transcription errors may be present           Waldo Rodriguez MD  12/06/22 5721

## 2023-05-14 ENCOUNTER — APPOINTMENT (OUTPATIENT)
Dept: CT IMAGING | Age: 43
End: 2023-05-14
Payer: COMMERCIAL

## 2023-05-14 ENCOUNTER — HOSPITAL ENCOUNTER (EMERGENCY)
Age: 43
Discharge: HOME | End: 2023-05-15
Attending: EMERGENCY MEDICINE
Payer: COMMERCIAL

## 2023-05-14 DIAGNOSIS — R10.32 LEFT LOWER QUADRANT ABDOMINAL PAIN: Primary | ICD-10-CM

## 2023-05-14 DIAGNOSIS — R03.0 ELEVATED BLOOD PRESSURE READING: ICD-10-CM

## 2023-05-14 DIAGNOSIS — K59.00 CONSTIPATION, UNSPECIFIED CONSTIPATION TYPE: ICD-10-CM

## 2023-05-14 PROCEDURE — 74177 CT ABD & PELVIS W/CONTRAST: CPT

## 2023-05-14 PROCEDURE — 99285 EMERGENCY DEPT VISIT HI MDM: CPT

## 2023-05-14 RX ORDER — KETOROLAC TROMETHAMINE 30 MG/ML
30 INJECTION, SOLUTION INTRAMUSCULAR; INTRAVENOUS ONCE
Status: COMPLETED | OUTPATIENT
Start: 2023-05-14 | End: 2023-05-15

## 2023-05-14 RX ORDER — 0.9 % SODIUM CHLORIDE 0.9 %
2000 INTRAVENOUS SOLUTION INTRAVENOUS ONCE
Status: COMPLETED | OUTPATIENT
Start: 2023-05-14 | End: 2023-05-15

## 2023-05-15 VITALS
BODY MASS INDEX: 40.81 KG/M2 | RESPIRATION RATE: 16 BRPM | OXYGEN SATURATION: 98 % | DIASTOLIC BLOOD PRESSURE: 78 MMHG | WEIGHT: 260 LBS | SYSTOLIC BLOOD PRESSURE: 148 MMHG | TEMPERATURE: 98 F | HEART RATE: 78 BPM | HEIGHT: 67 IN

## 2023-05-15 LAB
ALBUMIN SERPL-MCNC: 4.1 G/DL (ref 3.5–5.2)
ALP SERPL-CCNC: 106 U/L (ref 40–129)
ALT SERPL-CCNC: 39 U/L (ref 0–40)
ANION GAP SERPL CALCULATED.3IONS-SCNC: 13 MMOL/L (ref 7–16)
AST SERPL-CCNC: 23 U/L (ref 0–39)
BASOPHILS # BLD: 0 E9/L (ref 0–0.2)
BASOPHILS NFR BLD: 0 % (ref 0–2)
BILIRUB SERPL-MCNC: 0.4 MG/DL (ref 0–1.2)
BILIRUB UR QL STRIP: NEGATIVE
BILIRUB UR QL STRIP: NORMAL
BUN SERPL-MCNC: 14 MG/DL (ref 6–20)
CALCIUM SERPL-MCNC: 9.6 MG/DL (ref 8.6–10.2)
CHLORIDE SERPL-SCNC: 104 MMOL/L (ref 98–107)
CLARITY UR: CLEAR
CLARITY UR: NORMAL
CO2 SERPL-SCNC: 25 MMOL/L (ref 22–29)
COLOR UR: NORMAL
COLOR UR: YELLOW
CREAT SERPL-MCNC: 1.2 MG/DL (ref 0.7–1.2)
EOSINOPHIL # BLD: 0.21 E9/L (ref 0.05–0.5)
EOSINOPHIL NFR BLD: 2 % (ref 0–6)
ERYTHROCYTE [DISTWIDTH] IN BLOOD BY AUTOMATED COUNT: 14.1 FL (ref 11.5–15)
GLUCOSE SERPL-MCNC: 136 MG/DL (ref 74–99)
GLUCOSE UR STRIP-MCNC: NEGATIVE MG/DL
GLUCOSE UR STRIP-MCNC: NORMAL MG/DL
HCT VFR BLD AUTO: 44 % (ref 37–54)
HGB BLD-MCNC: 15 G/DL (ref 12.5–16.5)
HGB UR QL STRIP: NEGATIVE
HGB UR QL STRIP: NORMAL
KETONES UR STRIP-MCNC: ABNORMAL MG/DL
KETONES UR STRIP-MCNC: NORMAL MG/DL
LACTATE BLDV-SCNC: 1.7 MMOL/L (ref 0.5–2.2)
LEUKOCYTE ESTERASE UR QL STRIP: NEGATIVE
LEUKOCYTE ESTERASE UR QL STRIP: NORMAL
LIPASE: 30 U/L (ref 13–60)
LYMPHOCYTES # BLD: 6.03 E9/L (ref 1.5–4)
LYMPHOCYTES NFR BLD: 58 % (ref 20–42)
MCH RBC QN AUTO: 30.1 PG (ref 26–35)
MCHC RBC AUTO-ENTMCNC: 34.1 % (ref 32–34.5)
MCV RBC AUTO: 88.4 FL (ref 80–99.9)
MONOCYTES # BLD: 0.21 E9/L (ref 0.1–0.95)
MONOCYTES NFR BLD: 2 % (ref 2–12)
NEUTROPHILS # BLD: 3.95 E9/L (ref 1.8–7.3)
NEUTS SEG NFR BLD: 38 % (ref 43–80)
NITRITE UR QL STRIP: NEGATIVE
NITRITE UR QL STRIP: NORMAL
PH UR STRIP: 5.5 [PH] (ref 5–9)
PH UR STRIP: NORMAL [PH] (ref 5–9)
PLATELET # BLD AUTO: 283 E9/L (ref 130–450)
PMV BLD AUTO: 10 FL (ref 7–12)
POTASSIUM SERPL-SCNC: 3.8 MMOL/L (ref 3.5–5)
PROT SERPL-MCNC: 7.8 G/DL (ref 6.4–8.3)
PROT UR STRIP-MCNC: NEGATIVE MG/DL
PROT UR STRIP-MCNC: NORMAL MG/DL
RBC # BLD AUTO: 4.98 E12/L (ref 3.8–5.8)
RBC MORPH BLD: NORMAL
SODIUM SERPL-SCNC: 142 MMOL/L (ref 132–146)
SP GR UR STRIP: 1.02 (ref 1–1.03)
SP GR UR STRIP: NORMAL (ref 1–1.03)
UROBILINOGEN UR STRIP-ACNC: 0.2 E.U./DL
UROBILINOGEN UR STRIP-ACNC: NORMAL E.U./DL
WBC # BLD: 10.4 E9/L (ref 4.5–11.5)

## 2023-05-15 PROCEDURE — 2580000003 HC RX 258: Performed by: EMERGENCY MEDICINE

## 2023-05-15 PROCEDURE — 96374 THER/PROPH/DIAG INJ IV PUSH: CPT

## 2023-05-15 PROCEDURE — 36415 COLL VENOUS BLD VENIPUNCTURE: CPT

## 2023-05-15 PROCEDURE — 6360000004 HC RX CONTRAST MEDICATION: Performed by: RADIOLOGY

## 2023-05-15 PROCEDURE — 81003 URINALYSIS AUTO W/O SCOPE: CPT

## 2023-05-15 PROCEDURE — 85025 COMPLETE CBC W/AUTO DIFF WBC: CPT

## 2023-05-15 PROCEDURE — 6360000002 HC RX W HCPCS: Performed by: EMERGENCY MEDICINE

## 2023-05-15 PROCEDURE — 80053 COMPREHEN METABOLIC PANEL: CPT

## 2023-05-15 PROCEDURE — 83605 ASSAY OF LACTIC ACID: CPT

## 2023-05-15 PROCEDURE — 83690 ASSAY OF LIPASE: CPT

## 2023-05-15 RX ORDER — POLYETHYLENE GLYCOL 3350 17 G/17G
17 POWDER, FOR SOLUTION ORAL 2 TIMES DAILY
Qty: 60 EACH | Refills: 1 | Status: SHIPPED | OUTPATIENT
Start: 2023-05-15 | End: 2023-06-14

## 2023-05-15 RX ORDER — IBUPROFEN 800 MG/1
800 TABLET ORAL EVERY 8 HOURS PRN
Qty: 15 TABLET | Refills: 0 | Status: SHIPPED | OUTPATIENT
Start: 2023-05-15 | End: 2023-05-20

## 2023-05-15 RX ORDER — ONDANSETRON 4 MG/1
8 TABLET, ORALLY DISINTEGRATING ORAL 3 TIMES DAILY PRN
Qty: 14 TABLET | Refills: 0 | Status: SHIPPED | OUTPATIENT
Start: 2023-05-15

## 2023-05-15 RX ADMIN — IOPAMIDOL 18 ML: 755 INJECTION, SOLUTION INTRAVENOUS at 01:40

## 2023-05-15 RX ADMIN — SODIUM CHLORIDE 2000 ML: 9 INJECTION, SOLUTION INTRAVENOUS at 00:13

## 2023-05-15 RX ADMIN — KETOROLAC TROMETHAMINE 30 MG: 30 INJECTION, SOLUTION INTRAMUSCULAR; INTRAVENOUS at 00:14

## 2023-05-15 RX ADMIN — IOPAMIDOL 75 ML: 755 INJECTION, SOLUTION INTRAVENOUS at 01:40

## 2023-05-15 ASSESSMENT — PAIN DESCRIPTION - ONSET: ONSET: ON-GOING

## 2023-05-15 ASSESSMENT — PAIN DESCRIPTION - LOCATION
LOCATION: ABDOMEN
LOCATION: ABDOMEN

## 2023-05-15 ASSESSMENT — PAIN SCALES - GENERAL
PAINLEVEL_OUTOF10: 5
PAINLEVEL_OUTOF10: 2

## 2023-05-15 ASSESSMENT — PAIN DESCRIPTION - DESCRIPTORS
DESCRIPTORS: ACHING
DESCRIPTORS: ACHING

## 2023-05-15 ASSESSMENT — PAIN DESCRIPTION - PAIN TYPE: TYPE: ACUTE PAIN

## 2023-05-15 ASSESSMENT — PAIN - FUNCTIONAL ASSESSMENT: PAIN_FUNCTIONAL_ASSESSMENT: 0-10

## 2023-05-15 ASSESSMENT — PAIN DESCRIPTION - FREQUENCY: FREQUENCY: CONTINUOUS

## 2023-05-15 NOTE — ED PROVIDER NOTES
HPI:  5/14/23, Time: 10:57 PM EDT        Anthony Donald is a 38 y.o. male presenting to the ED for left lower abdominal pain, beginning 1 day ago. The complaint has been persistent, moderate in severity, and worsened by nothing. Patient's not had any hematemesis, no melena, hematochezia associated. No nausea or vomiting, no fever/chills. No relieving factors. Review of Systems:   A complete review of systems was performed and pertinent positives and negatives are stated within HPI, all other systems reviewed and are negative.    --------------------------------------------- PAST HISTORY ---------------------------------------------  Past Medical History:  has a past medical history of Hypertension. Past Surgical History:  has no past surgical history on file. Social History:  reports that he has been smoking cigarettes. He has been smoking an average of .5 packs per day. He has never used smokeless tobacco. He reports current alcohol use. He reports that he does not use drugs. Family History: family history includes Birth Defects in his brother; Cancer in his paternal grandmother; Diabetes in his father, maternal aunt, maternal grandfather, maternal grandmother, maternal uncle, and paternal grandmother; Heart Disease in his father and paternal grandmother; High Blood Pressure in his father, maternal aunt, maternal grandfather, maternal grandmother, maternal uncle, mother, and paternal grandmother; Stroke in his maternal aunt, maternal grandfather, and maternal grandmother. The patients home medications have been reviewed. Allergies: Patient has no known allergies.     -------------------------------------------------- RESULTS -------------------------------------------------  All laboratory and radiology results have been personally reviewed by myself   LABS:  Results for orders placed or performed during the hospital encounter of 05/14/23   CBC with Auto Differential   Result Value

## 2024-03-23 ENCOUNTER — HOSPITAL ENCOUNTER (EMERGENCY)
Age: 44
Discharge: HOME | End: 2024-03-23
Attending: EMERGENCY MEDICINE
Payer: COMMERCIAL

## 2024-03-23 ENCOUNTER — APPOINTMENT (OUTPATIENT)
Dept: GENERAL RADIOLOGY | Age: 44
End: 2024-03-23
Payer: COMMERCIAL

## 2024-03-23 ENCOUNTER — APPOINTMENT (OUTPATIENT)
Dept: CT IMAGING | Age: 44
End: 2024-03-23
Payer: COMMERCIAL

## 2024-03-23 VITALS
OXYGEN SATURATION: 93 % | HEART RATE: 79 BPM | SYSTOLIC BLOOD PRESSURE: 150 MMHG | DIASTOLIC BLOOD PRESSURE: 80 MMHG | WEIGHT: 240.08 LBS | HEIGHT: 67 IN | BODY MASS INDEX: 37.68 KG/M2 | RESPIRATION RATE: 21 BRPM | TEMPERATURE: 97.4 F

## 2024-03-23 DIAGNOSIS — I15.9 SECONDARY HYPERTENSION: ICD-10-CM

## 2024-03-23 DIAGNOSIS — J10.1 INFLUENZA B: Primary | ICD-10-CM

## 2024-03-23 DIAGNOSIS — R07.89 ATYPICAL CHEST PAIN: ICD-10-CM

## 2024-03-23 LAB
ALBUMIN SERPL-MCNC: 4.4 G/DL (ref 3.5–5.2)
ALP SERPL-CCNC: 105 U/L (ref 40–129)
ALT SERPL-CCNC: 35 U/L (ref 0–40)
ANION GAP SERPL CALCULATED.3IONS-SCNC: 11 MMOL/L (ref 7–16)
AST SERPL-CCNC: 32 U/L (ref 0–39)
BASOPHILS # BLD: 0.02 K/UL (ref 0–0.2)
BASOPHILS NFR BLD: 0 % (ref 0–2)
BILIRUB SERPL-MCNC: 0.4 MG/DL (ref 0–1.2)
BNP SERPL-MCNC: 98 PG/ML (ref 0–125)
BUN SERPL-MCNC: 11 MG/DL (ref 6–20)
CALCIUM SERPL-MCNC: 9 MG/DL (ref 8.6–10.2)
CHLORIDE SERPL-SCNC: 103 MMOL/L (ref 98–107)
CO2 SERPL-SCNC: 26 MMOL/L (ref 22–29)
CREAT SERPL-MCNC: 1.3 MG/DL (ref 0.7–1.2)
D DIMER: <200 NG/ML DDU (ref 0–232)
EKG ATRIAL RATE: 79 BPM
EKG P AXIS: 50 DEGREES
EKG P-R INTERVAL: 160 MS
EKG Q-T INTERVAL: 388 MS
EKG QRS DURATION: 90 MS
EKG QTC CALCULATION (BAZETT): 444 MS
EKG R AXIS: -9 DEGREES
EKG T AXIS: 168 DEGREES
EKG VENTRICULAR RATE: 79 BPM
EOSINOPHIL # BLD: 0.06 K/UL (ref 0.05–0.5)
EOSINOPHILS RELATIVE PERCENT: 1 % (ref 0–6)
ERYTHROCYTE [DISTWIDTH] IN BLOOD BY AUTOMATED COUNT: 13.6 % (ref 11.5–15)
GFR SERPL CREATININE-BSD FRML MDRD: >60 ML/MIN/1.73M2
GLUCOSE SERPL-MCNC: 132 MG/DL (ref 74–99)
HCT VFR BLD AUTO: 48.3 % (ref 37–54)
HGB BLD-MCNC: 16.3 G/DL (ref 12.5–16.5)
IMM GRANULOCYTES # BLD AUTO: <0.03 K/UL (ref 0–0.58)
IMM GRANULOCYTES NFR BLD: 0 % (ref 0–5)
INFLUENZA A BY PCR: NOT DETECTED
INFLUENZA B BY PCR: DETECTED
LYMPHOCYTES NFR BLD: 1.96 K/UL (ref 1.5–4)
LYMPHOCYTES RELATIVE PERCENT: 42 % (ref 20–42)
MCH RBC QN AUTO: 29.7 PG (ref 26–35)
MCHC RBC AUTO-ENTMCNC: 33.7 G/DL (ref 32–34.5)
MCV RBC AUTO: 88.1 FL (ref 80–99.9)
MONOCYTES NFR BLD: 0.8 K/UL (ref 0.1–0.95)
MONOCYTES NFR BLD: 17 % (ref 2–12)
NEUTROPHILS NFR BLD: 39 % (ref 43–80)
NEUTS SEG NFR BLD: 1.8 K/UL (ref 1.8–7.3)
PLATELET, FLUORESCENCE: 199 K/UL (ref 130–450)
PMV BLD AUTO: 10.5 FL (ref 7–12)
POTASSIUM SERPL-SCNC: 3.6 MMOL/L (ref 3.5–5)
PROT SERPL-MCNC: 8.1 G/DL (ref 6.4–8.3)
RBC # BLD AUTO: 5.48 M/UL (ref 3.8–5.8)
SARS-COV-2 RDRP RESP QL NAA+PROBE: NOT DETECTED
SODIUM SERPL-SCNC: 140 MMOL/L (ref 132–146)
SPECIMEN DESCRIPTION: NORMAL
TROPONIN I SERPL HS-MCNC: 7 NG/L (ref 0–11)
TROPONIN I SERPL HS-MCNC: 7 NG/L (ref 0–11)
WBC OTHER # BLD: 4.7 K/UL (ref 4.5–11.5)

## 2024-03-23 PROCEDURE — 80053 COMPREHEN METABOLIC PANEL: CPT

## 2024-03-23 PROCEDURE — 85379 FIBRIN DEGRADATION QUANT: CPT

## 2024-03-23 PROCEDURE — 87635 SARS-COV-2 COVID-19 AMP PRB: CPT

## 2024-03-23 PROCEDURE — 83880 ASSAY OF NATRIURETIC PEPTIDE: CPT

## 2024-03-23 PROCEDURE — 87502 INFLUENZA DNA AMP PROBE: CPT

## 2024-03-23 PROCEDURE — 2580000003 HC RX 258: Performed by: EMERGENCY MEDICINE

## 2024-03-23 PROCEDURE — 70450 CT HEAD/BRAIN W/O DYE: CPT

## 2024-03-23 PROCEDURE — 6360000002 HC RX W HCPCS: Performed by: EMERGENCY MEDICINE

## 2024-03-23 PROCEDURE — 96374 THER/PROPH/DIAG INJ IV PUSH: CPT

## 2024-03-23 PROCEDURE — 93005 ELECTROCARDIOGRAM TRACING: CPT | Performed by: EMERGENCY MEDICINE

## 2024-03-23 PROCEDURE — 71045 X-RAY EXAM CHEST 1 VIEW: CPT

## 2024-03-23 PROCEDURE — 85025 COMPLETE CBC W/AUTO DIFF WBC: CPT

## 2024-03-23 PROCEDURE — 99285 EMERGENCY DEPT VISIT HI MDM: CPT

## 2024-03-23 PROCEDURE — 84484 ASSAY OF TROPONIN QUANT: CPT

## 2024-03-23 RX ORDER — ONDANSETRON 4 MG/1
4 TABLET, ORALLY DISINTEGRATING ORAL EVERY 8 HOURS PRN
Qty: 9 TABLET | Refills: 0 | Status: SHIPPED | OUTPATIENT
Start: 2024-03-23 | End: 2024-03-26

## 2024-03-23 RX ORDER — LABETALOL HYDROCHLORIDE 5 MG/ML
10 INJECTION, SOLUTION INTRAVENOUS ONCE
Status: COMPLETED | OUTPATIENT
Start: 2024-03-23 | End: 2024-03-23

## 2024-03-23 RX ORDER — 0.9 % SODIUM CHLORIDE 0.9 %
500 INTRAVENOUS SOLUTION INTRAVENOUS ONCE
Status: COMPLETED | OUTPATIENT
Start: 2024-03-23 | End: 2024-03-23

## 2024-03-23 RX ADMIN — SODIUM CHLORIDE 500 ML: 9 INJECTION, SOLUTION INTRAVENOUS at 19:23

## 2024-03-23 RX ADMIN — LABETALOL HYDROCHLORIDE 10 MG: 5 INJECTION, SOLUTION INTRAVENOUS at 19:23

## 2024-03-23 ASSESSMENT — PAIN - FUNCTIONAL ASSESSMENT: PAIN_FUNCTIONAL_ASSESSMENT: NONE - DENIES PAIN

## 2024-03-23 ASSESSMENT — LIFESTYLE VARIABLES
HOW MANY STANDARD DRINKS CONTAINING ALCOHOL DO YOU HAVE ON A TYPICAL DAY: 1 OR 2
HOW OFTEN DO YOU HAVE A DRINK CONTAINING ALCOHOL: 2-4 TIMES A MONTH

## 2024-03-23 NOTE — ED NOTES
Department of Emergency Medicine  FIRST PROVIDER TRIAGE NOTE             Independent MLP           3/23/24  4:36 PM EDT    Date of Encounter: 3/23/24   MRN: [id number]      HPI: Ayad Andino is a 43 y.o. male who presents to the ED for Hypertension (Htn at home (200's/110's), HA, nausea; currently denies CP, SOB)     HEADACHE, NAUSEA AT HOME.  FOUND TO BE HYPERTENSIVE.    HAS BEEN TAKING HIS BP MEDICATIONS AS PRESCRIBED.  HE HAS NOT CONTACTED HIS PCP ABOUT THIS YET.     ROS: Negative for cp or sob.    PE: Gen Appearance/Constitutional: alert  HEENT: NC/NT. PERRLA,  Airway patent.     Initial Plan of Care: All treatment areas with department are currently occupied. Plan to order/Initiate the following while awaiting opening in ED.  Initiate Treatment-Testing, Proceed toTreatment Area When Bed Available for ED Attending/MLP to Continue Care    Electronically signed by ANTHONY Mcgovern CNP   DD: 3/23/24      Rojelio Cota APRN - CNP  03/23/24 [id number]

## 2024-03-23 NOTE — ED PROVIDER NOTES
[facility] EMERGENCY DEPARTMENT  EMERGENCY DEPARTMENT ENCOUNTER        Pt Name: Ayad Andino  MRN: [id number]  Birthdate 1980  Date of evaluation: 3/23/2024  Provider: Leana Sheldon MD  PCP: Edy Marshall APRN - CNP  Note Started: 6:58 PM EDT 3/23/24    CHIEF COMPLAINT       Chief Complaint   Patient presents with    Hypertension     Htn at home (200's/110's), HA, nausea; currently denies CP, SOB       HISTORY OF PRESENT ILLNESS: 1 or more Elements   History From: Patient    Limitations to history : None    Ayad Andino is a 43 y.o. male who presents to the emergency department stating that his blood pressure has been high for the past week.  He states he had also had a dull headache intermittent chest \"ache\" and body aches.  No shortness of breath.  No neck stiffness.States occasionally feels sweaty and clammy.  He thinks he may have had fever earlier in the week.  His significant other has been sick with similar symptoms.  No cough or congestion.  Denies shortness of breath denies pleuritic pain.  Just states he is \"felt off\".  He is on losartan for blood pressure has not missed any doses but been checking his blood pressure it has been over 200.  He decided to come in today as a blood pressure was not going down.  No numbness or weakness to the extremities.  No back pain no abdominal pain.  No acute chest pain at this time.    Nursing Notes were all reviewed and agreed with or any disagreements were addressed in the HPI.      REVIEW OF EXTERNAL NOTE :       Echocardiogram from 8/20/2022 shows an EF of 70%.    REVIEW OF SYSTEMS :           Positives and Pertinent negatives as per HPI.     SURGICAL HISTORY   History reviewed. No pertinent surgical history.    CURRENTMEDICATIONS       Previous Medications    ALBUTEROL SULFATE HFA (PROAIR HFA) 108 (90 BASE) MCG/ACT INHALER    Inhale 2 puffs into the lungs every 6 hours as needed for Shortness of Breath         Ayad Andino is a 43 y.o. male who presents to the ED for blood pressure has been high for the past week.  He states he had also had a dull headache intermittent chest \"ache\" and body aches.  No shortness of breath.  States occasionally feels sweaty and clammy.  He thinks he may have had fever earlier in the week.  His significant other has been sick with similar symptoms.  No cough or congestion.  Denies shortness of breath denies pleuritic pain.  Just states he is \"felt off\".  He is on losartan for blood pressure has not missed any doses but been checking his blood pressure it has been over 200.  He decided to come in today as a blood pressure was not going down.     Physical exam findings: Blood pressure 208/103.  Nontoxic well-appearing.    Differential diagnosis (includes but not limited to):  Hypertension hypertensive urgency ACS MI dysrhythmia influenza COVID musculoskeletal pain      Chronic conditions:  has a past medical history of Hypertension.          ED Course Summary:(labs and imaging reviewed, interventions, reassessment, consults,shared decision making with patient, disposition)    EKG ordered to evaluate patient's current cardiac rate, rhythm, and QT interval. CBC is ordered to evaluate for any signs of infection or inflammation by obtaining a WBC count, or any signs of acute anemia by interpreting hemoglobin. CMP for any electrolyte imbalances, kidney function, hepatic injury or any elevations in anion gap. Troponin as a marker for myocardial ischemia or heart strain. Viral swabs to evaluate for viral etiology of symptoms. BNP to evaluate for heart failure and/or as a marker for heart strain. D-dimer used to rule out PE given low pre-test probability. Chest x-ray for any possible signs of, but without limitation to, pneumonia, pleural effusions, cardiomegaly, pneumothorax, atelectasis, rib or sternal abnormalities including fractures.        EKG shows normal sinus rhythm at 79 bpm.  No

## 2024-03-25 LAB
EKG ATRIAL RATE: 79 BPM
EKG P AXIS: 50 DEGREES
EKG P-R INTERVAL: 160 MS
EKG Q-T INTERVAL: 388 MS
EKG QRS DURATION: 90 MS
EKG QTC CALCULATION (BAZETT): 444 MS
EKG R AXIS: -9 DEGREES
EKG T AXIS: 168 DEGREES
EKG VENTRICULAR RATE: 79 BPM

## 2024-03-25 PROCEDURE — 93010 ELECTROCARDIOGRAM REPORT: CPT | Performed by: INTERNAL MEDICINE
